# Patient Record
Sex: MALE | Race: ASIAN | NOT HISPANIC OR LATINO | URBAN - METROPOLITAN AREA
[De-identification: names, ages, dates, MRNs, and addresses within clinical notes are randomized per-mention and may not be internally consistent; named-entity substitution may affect disease eponyms.]

---

## 2023-08-30 PROBLEM — Z86.018: Status: ACTIVE | Noted: 2023-08-30

## 2023-08-30 PROBLEM — R91.1 LUNG NODULE: Status: ACTIVE | Noted: 2023-08-30

## 2023-08-30 PROBLEM — K83.8 DILATED BILE DUCT: Status: ACTIVE | Noted: 2023-08-30

## 2023-08-30 PROBLEM — R03.0 ELEVATED BLOOD PRESSURE READING: Status: ACTIVE | Noted: 2023-08-30

## 2023-08-30 PROBLEM — C34.2: Status: ACTIVE | Noted: 2023-08-30

## 2023-08-30 PROBLEM — R60.0 BILATERAL LEG EDEMA: Status: ACTIVE | Noted: 2023-08-30

## 2023-08-30 PROBLEM — I87.2 VENOUS INSUFFICIENCY OF LEFT LOWER EXTREMITY: Status: ACTIVE | Noted: 2023-08-30

## 2023-08-30 PROBLEM — C34.91 NON-SMALL CELL CANCER OF RIGHT LUNG (MULTI): Status: ACTIVE | Noted: 2023-08-30

## 2023-08-30 PROBLEM — D12.6 ADENOMATOUS COLON POLYP: Status: ACTIVE | Noted: 2023-08-30

## 2023-08-30 RX ORDER — FIBER
1 TABLET ORAL DAILY
COMMUNITY

## 2023-08-30 RX ORDER — AMLODIPINE BESYLATE 10 MG/1
1 TABLET ORAL DAILY
COMMUNITY
Start: 2022-11-30

## 2023-10-06 ENCOUNTER — APPOINTMENT (OUTPATIENT)
Dept: LAB | Facility: HOSPITAL | Age: 73
End: 2023-10-06
Payer: COMMERCIAL

## 2023-10-06 ENCOUNTER — HOSPITAL ENCOUNTER (OUTPATIENT)
Dept: RADIOLOGY | Facility: HOSPITAL | Age: 73
Discharge: HOME | End: 2023-10-06
Payer: COMMERCIAL

## 2023-10-06 ENCOUNTER — LAB (OUTPATIENT)
Dept: LAB | Facility: HOSPITAL | Age: 73
End: 2023-10-06
Payer: COMMERCIAL

## 2023-10-06 DIAGNOSIS — C34.2 PRIMARY ADENOCARCINOMA OF MIDDLE LOBE OF RIGHT LUNG (MULTI): ICD-10-CM

## 2023-10-06 DIAGNOSIS — C34.2 PRIMARY ADENOCARCINOMA OF MIDDLE LOBE OF RIGHT LUNG (MULTI): Primary | ICD-10-CM

## 2023-10-06 DIAGNOSIS — C34.2 MALIGNANT NEOPLASM OF MIDDLE LOBE, BRONCHUS OR LUNG (MULTI): ICD-10-CM

## 2023-10-06 LAB
ALBUMIN SERPL BCP-MCNC: 3.7 G/DL (ref 3.4–5)
ALP SERPL-CCNC: 57 U/L (ref 33–136)
ALT SERPL W P-5'-P-CCNC: 23 U/L (ref 10–52)
ANION GAP SERPL CALC-SCNC: 11 MMOL/L (ref 10–20)
AST SERPL W P-5'-P-CCNC: 34 U/L (ref 9–39)
BILIRUB SERPL-MCNC: 0.5 MG/DL (ref 0–1.2)
BUN SERPL-MCNC: 11 MG/DL (ref 6–23)
CALCIUM SERPL-MCNC: 8.4 MG/DL (ref 8.6–10.3)
CHLORIDE SERPL-SCNC: 103 MMOL/L (ref 98–107)
CO2 SERPL-SCNC: 29 MMOL/L (ref 21–32)
CREAT SERPL-MCNC: 0.86 MG/DL (ref 0.5–1.3)
GFR SERPL CREATININE-BSD FRML MDRD: 90 ML/MIN/1.73M*2
GLUCOSE SERPL-MCNC: 99 MG/DL (ref 74–99)
POTASSIUM SERPL-SCNC: 4.3 MMOL/L (ref 3.5–5.3)
PROT SERPL-MCNC: 6.8 G/DL (ref 6.4–8.2)
SODIUM SERPL-SCNC: 139 MMOL/L (ref 136–145)

## 2023-10-06 PROCEDURE — 2550000001 HC RX 255 CONTRASTS: Mod: SE | Performed by: STUDENT IN AN ORGANIZED HEALTH CARE EDUCATION/TRAINING PROGRAM

## 2023-10-06 PROCEDURE — A9575 INJ GADOTERATE MEGLUMI 0.1ML: HCPCS | Mod: SE | Performed by: STUDENT IN AN ORGANIZED HEALTH CARE EDUCATION/TRAINING PROGRAM

## 2023-10-06 PROCEDURE — 36415 COLL VENOUS BLD VENIPUNCTURE: CPT

## 2023-10-06 PROCEDURE — 70553 MRI BRAIN STEM W/O & W/DYE: CPT | Performed by: RADIOLOGY

## 2023-10-06 PROCEDURE — 80053 COMPREHEN METABOLIC PANEL: CPT

## 2023-10-06 PROCEDURE — 71260 CT THORAX DX C+: CPT | Performed by: RADIOLOGY

## 2023-10-06 PROCEDURE — 74177 CT ABD & PELVIS W/CONTRAST: CPT

## 2023-10-06 PROCEDURE — 70553 MRI BRAIN STEM W/O & W/DYE: CPT

## 2023-10-06 PROCEDURE — 74177 CT ABD & PELVIS W/CONTRAST: CPT | Performed by: RADIOLOGY

## 2023-10-06 RX ORDER — GADOTERATE MEGLUMINE 376.9 MG/ML
10 INJECTION INTRAVENOUS
Status: COMPLETED | OUTPATIENT
Start: 2023-10-06 | End: 2023-10-06

## 2023-10-06 RX ADMIN — IOHEXOL 75 ML: 350 INJECTION, SOLUTION INTRAVENOUS at 11:52

## 2023-10-06 RX ADMIN — GADOTERATE MEGLUMINE 10 ML: 376.9 INJECTION INTRAVENOUS at 09:30

## 2023-10-11 ENCOUNTER — SPECIALTY PHARMACY (OUTPATIENT)
Dept: PHARMACY | Facility: CLINIC | Age: 73
End: 2023-10-11

## 2023-10-11 NOTE — PROGRESS NOTES
[facility] Clinical Note    Sotero Pierre is a 73 y.o. male, who is on the specialty pharmacy service for management of: Oncology Core with status of: (Enrolled)     Sotero was contacted on 10/11/2023 with a Bengali  (ID: [id number]).    Refer to the encounter summary report for documentation details about patient counseling and education.      Medication Adherence  The importance of adherence was discussed with the patient and they were advised to take the medication as prescribed by their provider. Sotero was encouraged to call his physician's office if they have a question regarding a missed dose.     Conclusion  Rate your quality of life on scale of 1-10: 10 - Completely satisfied  Rate your satisfaction with  Specialty Pharmacy on scale of 1-10: 10 - Completely satisfied      Patient advised to contact the pharmacy if there are any changes to her medication list, including prescriptions, OTC medications, herbal products, or supplements. Patient was advised of [facility]’s dispensing process, refill timeline, contact information ([phone]), and patient management follow up. Patient confirmed understanding of education conducted during assessment. All patient questions and concerns were addressed to the best of my ability. Patient was encouraged to contact the specialty pharmacy with any questions or concerns.    Confirmed follow-up outreaches are properly scheduled. Reviewed goals of therapy in the program targets.    Ramy Celaya, PharmD

## 2023-10-17 ENCOUNTER — OFFICE VISIT (OUTPATIENT)
Dept: HEMATOLOGY/ONCOLOGY | Facility: HOSPITAL | Age: 73
End: 2023-10-17
Payer: COMMERCIAL

## 2023-10-17 VITALS
RESPIRATION RATE: 16 BRPM | SYSTOLIC BLOOD PRESSURE: 137 MMHG | DIASTOLIC BLOOD PRESSURE: 81 MMHG | BODY MASS INDEX: 20.63 KG/M2 | HEART RATE: 91 BPM | TEMPERATURE: 96.6 F | OXYGEN SATURATION: 96 % | WEIGHT: 120.2 LBS

## 2023-10-17 DIAGNOSIS — C34.2 PRIMARY ADENOCARCINOMA OF MIDDLE LOBE OF RIGHT LUNG (MULTI): Primary | ICD-10-CM

## 2023-10-17 PROBLEM — C34.91 NON-SMALL CELL CANCER OF RIGHT LUNG (MULTI): Status: RESOLVED | Noted: 2023-08-30 | Resolved: 2023-10-17

## 2023-10-17 PROCEDURE — 99215 OFFICE O/P EST HI 40 MIN: CPT | Mod: GC | Performed by: STUDENT IN AN ORGANIZED HEALTH CARE EDUCATION/TRAINING PROGRAM

## 2023-10-17 ASSESSMENT — ENCOUNTER SYMPTOMS
DEPRESSION: 0
OCCASIONAL FEELINGS OF UNSTEADINESS: 0
LOSS OF SENSATION IN FEET: 0

## 2023-10-17 ASSESSMENT — PAIN SCALES - GENERAL: PAINLEVEL: 0-NO PAIN

## 2023-10-17 NOTE — PROGRESS NOTES
[facility] - Medical Oncology Follow-Up Visit    Patient ID: Sotero Pierre is a 73 y.o. male presenting with metastatic EGFR mutant NSCLC     Current therapy: Osimertinib     Chief Concern: follow up to discuss scans and labs    Oncologic History:   DIAGNOSIS  NSCLC adenocarcinoma     STAGING  [id number], stage IV     CURRENT SITES OF DISEASE  RML, R femur, ribs, L clavicle     MOLECULAR GENOMICS  PD-L1 2%  EGFR p.[id number] (NM_[id number] c.2236_2250del15)   KRAS p.A146P (NM_[id number] c.436G>C), subclonal   PTEN p.I135T (NM_000314 c.404T>C)   PTEN p.G132A (NM_000314 c.395G>C)         PRIOR THERAPY  None     CURRENT THERAPY  osimertinib 9/10/22 - present        CURRENT ONCOLOGICAL PROBLEMS  None     HPI   Mr. Pierre is a 71 yo with no significant PMH who had a low-dose screening chest CT on 7/11/22 concerning for 2.4 cm opacity in the RML with a few associated 4mm nodules. He met Dr. Mims on 8/10/22 and had EBUS on 8/15/22 with RML FNA revealing NSCLC  adenocarcinoma, PD-L1 2%, EGFR exon 19, neg lymph node 7. He had PET/CT scan on 8/22/22 with mild FDG-avidity of the opacity in the RML, no lymph nodes, and suspicious FDG-avidity of the proximal R femur and mild avidity of several ribs and L clavicle.  Brain MRI negative. He started osimertinib on 9/10/22.     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 He has a son who lives in [place].  Tob: quit smoking 8/11/22, 1 pack/3-4 days, 44 years  EtOH: 1-2 beers every day  Illicits: none     CURRENT MEDS  see below     ALLERGIES  see below     FAMILY HISTORY  no one in the family with cancer    Subjective:  Patient seen in clinic today on his own. He declined use of [employer] .   He is doing very well with no major health changes since his last visit. He notes a small area of dry skin on his left forearm that he is using non medicated lotion  for. It does not bother him too much.     He is still working part time at the hair salon working 8hr shifts on his feet. He reports he can walk for an hour without stopping. He denies any cough or SOB. Energy is up and down. He says appetite is poor but stable. He is eating 3 meals a day but small portions. He reports his weight is stable. No abd pain, n/v. He is having daily Bms without evidence of bleeding. No leg swelling, numbness/tingling, mouth sores.     Meds (Current):    Current Outpatient Medications:     amLODIPine (Norvasc) 10 mg tablet, Take 1 tablet (10 mg) by mouth once daily., Disp: , Rfl:     multivitamin-min-iron-FA-vit K (Multi-Day Plus Minerals) 18 mg iron-400 mcg-25 mcg tablet, Take 1 tablet by mouth once daily., Disp: , Rfl:     osimertinib (Tagrisso) 80 mg tablet, Take 1 tablet (80 mg total) by mouth once daily., Disp: , Rfl:     osimertinib (Tagrisso) 80 mg tablet, TAKE ONE (1) TABLET BY MOUTH ONCE A DAY, Disp: 80 tablet, Rfl: 2    Review of Systems   All other systems reviewed and are negative.       Objective   BSA: 1.57 meters squared  /81 (BP Location: Left arm, Patient Position: Sitting)   Pulse 91   Temp 35.9 °C (96.6 °F) (Core)   Resp 16   Wt 54.5 kg (120 lb 3.2 oz)   SpO2 96%   BMI 20.63 kg/m²     ECOG Performance Status: 0  Asymptomatic     Physical Exam:  General: elderly male, well appearing, alert, conversant, in no apparent distress  HEENT: normocephalic, atraumatic, EOMI, no scleral icterus  CV: RRR, no murmurs  Pulm: CTAB, no wheezes or crackles, no increased work of breathing  Abd: soft, non tender, non distended  Ext: warm, no lower extremity edema  Skin: several patches of scale present on the left forearm  Neuro: moving all extremities  Psych: normal affect      Results:  Labs:  Lab Results   Component Value Date    WBC 4.4 07/07/2023    HGB 11.8 (L) 07/07/2023    HCT 34.4 (L) 07/07/2023    MCV 80 07/07/2023     07/07/2023      Lab Results   Component  "Value Date    NEUTROABS 2.38 07/07/2023      Lab Results   Component Value Date    GLUCOSE 99 10/06/2023    CALCIUM 8.4 (L) 10/06/2023     10/06/2023    K 4.3 10/06/2023    CO2 29 10/06/2023     10/06/2023    BUN 11 10/06/2023    CREATININE 0.86 10/06/2023     Lab Results   Component Value Date    ALT 23 10/06/2023    AST 34 10/06/2023    ALKPHOS 57 10/06/2023    BILITOT 0.5 10/06/2023    BILIDIR 0.1 01/06/2023      No results found for: \"ACTH\", \"CORTISOL\", \"TSH\", \"FREET4\"    Imaging:  I have personally reviewed the below imaging and concur with the reported findings unless otherwise stated:    === Results for orders placed during the hospital encounter of 10/06/23 ===    MR brain w and wo IV contrast [[id number]] 10/06/2023    Status: Normal  1.  No abnormal enhancement to suggest intracranial metastatic  disease.  2.  Similar chronic intracranial findings as above.      I personally reviewed the images/study with Agus Lama MD (Radiology  Resident) and I agree with the findings as stated. This study was  interpreted at [place], [place].    MACRO:  None    Signed by: Robb Persaud 10/6/2023 11:00 AM  Dictation workstation:   [id number]    CT C/A/P 10/6/23  IMPRESSION:  Non-small-cell lung cancer restaging scan. Findings compared with  most recent CT C AP from 07/07/2023:      CHEST:  1. Stable appearance of low volume intrathoracic tumor burden without  significant change. There is no new site of disease in the chest on  this current study.      ABDOMEN-PELVIS:  1.   There is area of heterogeneity in liver segment VI measuring 1.8  x 1.1 cm which was previously seen on several distant prior studies  and appears more discretely on today's study. Recommend PET-CT or MRI  to rule out hepatic metastatic disease or short-term follow-up CT.  2. Stable appearance of right proximal femur marrow soft tissue when  compared to prior study 07/07/2023.    "       Assessment/Plan      Sotero Pierre is a 73 y.o. male here for follow up of metastatic NSCLC on osimertinib here for review of his scans and labs.    # NSCLC adenocarcinoma  - [id number]  - has osseous metastases, asymptomatic  - PD-L1 2%, EGFR exon 19 deletion  - started osimertinib 9/10/22, tolerating well  - did have diffuse rash which resolved on prednisone. will not dose reduce unless rash recurs. unclear if this is drug rash - no pictures - has not recurred since  - had mild BLE edema which resolved   - personally reviewed scans of CT C/A/P and brain MRI today with continued response. Note liver lesion seen more defined on scans   - repeat CT C/A/P in 3 months and RTC then with labs  - will need yearly brain MRI, due 9/2024 (not yet ordered)    #Liver hemangioma   -seen on MRI from 11/2022, corresponds with lesion visualized on CT scan     # BLE edema - resolved  - nonpitting, mild, resolves with elevation  - noted on 10/2022 mild LLE venous insufficiency  - echocardiogram normal     # Rash - resolved  - had diffuse rash which resolved on 5 days prednisone and has not recurred. unclear if this is a drug rash or not. discussed that if this recurs, to please be evaluated by our team/ACC  - he is concerned with navigating the phone system given language barrier, and has good rapport with his PCP  - encouraged to take pictures if rash recurs and try to see us still  - will not dose reduce unless rash recurs     # Dilated common bile duct, intrahepatic biliary ducts  - seen on restaging scan, asymptomatic - RUQ US as well  - had MRCP and saw GI - will f/u with them     # Alcohol use  - recommended decreasing/stopping daily alcohol use while on osimertinib     # Advanced care planning  - discussed incurable but treatable nature of metastatic lung cancer. Goal of therapy is to prolong life and maintain quality of life. He is in agreement    RTC in 3 months for repeat scans and labs    Patient seen and discussed with  Dr. Mao.     Germania Sheppard MD  IM PGY-3    I saw and evaluated the patient with the resident. I agree with their  assessment and plan unless otherwise noted.

## 2023-10-23 ENCOUNTER — SPECIALTY PHARMACY (OUTPATIENT)
Dept: PHARMACY | Facility: CLINIC | Age: 73
End: 2023-10-23

## 2023-10-23 ENCOUNTER — PHARMACY VISIT (OUTPATIENT)
Dept: PHARMACY | Facility: CLINIC | Age: 73
End: 2023-10-23
Payer: COMMERCIAL

## 2023-10-23 PROCEDURE — RXMED WILLOW AMBULATORY MEDICATION CHARGE

## 2023-11-20 ENCOUNTER — PHARMACY VISIT (OUTPATIENT)
Dept: PHARMACY | Facility: CLINIC | Age: 73
End: 2023-11-20
Payer: COMMERCIAL

## 2023-11-20 ENCOUNTER — SPECIALTY PHARMACY (OUTPATIENT)
Dept: PHARMACY | Facility: CLINIC | Age: 73
End: 2023-11-20

## 2023-11-20 PROCEDURE — RXMED WILLOW AMBULATORY MEDICATION CHARGE

## 2023-12-20 ENCOUNTER — TELEPHONE (OUTPATIENT)
Dept: ADMISSION | Facility: HOSPITAL | Age: 73
End: 2023-12-20
Payer: COMMERCIAL

## 2023-12-20 ENCOUNTER — SPECIALTY PHARMACY (OUTPATIENT)
Dept: PHARMACY | Facility: CLINIC | Age: 73
End: 2023-12-20

## 2023-12-20 ENCOUNTER — NURSE TRIAGE (OUTPATIENT)
Dept: ADMISSION | Facility: HOSPITAL | Age: 73
End: 2023-12-20
Payer: COMMERCIAL

## 2023-12-20 DIAGNOSIS — C34.2 PRIMARY ADENOCARCINOMA OF MIDDLE LOBE OF RIGHT LUNG (MULTI): Primary | ICD-10-CM

## 2023-12-20 DIAGNOSIS — C34.2 CANCER OF MIDDLE LOBE OF LUNG (MULTI): Primary | ICD-10-CM

## 2023-12-20 PROCEDURE — RXMED WILLOW AMBULATORY MEDICATION CHARGE

## 2023-12-20 NOTE — TELEPHONE ENCOUNTER
Pt scheduled for ACC appt tomorrow.   #[phone] left voicemail for pt regarding appointment tomorrow, 12/21/23, at 10:00.

## 2023-12-20 NOTE — TELEPHONE ENCOUNTER
Sotero Pierre called the refill line for his Tagrisso. Would like refills to be sent to [facility]. Message sent to team to send in.

## 2023-12-20 NOTE — TELEPHONE ENCOUNTER
Call returned using  services.  Pt is reporting rash on bilateral antecubital areas of both arms and on his abdomen below his navel.  He describes areas look like bruises.  No itching.  He states that the rash developed this month when the weather got cold.  He indicated he had a rash like this in October 2022.  He saw his family doctor today who gave him a cream and told him he needed to see his oncologist.

## 2023-12-20 NOTE — TELEPHONE ENCOUNTER
Juhi called to request an appt on pt's behalf for a rash that is not responding to topical cream.  She was unable to provide more details and states that pt will need an interpretor to obtain additional information.  Additional Information  • Have you ever had a rash like this before?     Last year  • Commented on: Do you have any swelling in your face, tongue, throat, or elsewhere?     denies  • Commented on: If the rash itches, is it getting worse? Does anything help relieve the itching?     N/A    Protocols used: Rash

## 2023-12-21 ENCOUNTER — TELEPHONE (OUTPATIENT)
Dept: ADMISSION | Facility: HOSPITAL | Age: 73
End: 2023-12-21
Payer: COMMERCIAL

## 2023-12-21 ENCOUNTER — OFFICE VISIT (OUTPATIENT)
Dept: HEMATOLOGY/ONCOLOGY | Facility: HOSPITAL | Age: 73
End: 2023-12-21
Payer: COMMERCIAL

## 2023-12-21 VITALS
TEMPERATURE: 98.4 F | DIASTOLIC BLOOD PRESSURE: 70 MMHG | SYSTOLIC BLOOD PRESSURE: 124 MMHG | WEIGHT: 118.61 LBS | HEART RATE: 104 BPM | BODY MASS INDEX: 20.36 KG/M2 | RESPIRATION RATE: 18 BRPM | OXYGEN SATURATION: 100 %

## 2023-12-21 DIAGNOSIS — C34.2 PRIMARY ADENOCARCINOMA OF MIDDLE LOBE OF RIGHT LUNG (MULTI): Primary | ICD-10-CM

## 2023-12-21 DIAGNOSIS — R21 RASH: ICD-10-CM

## 2023-12-21 DIAGNOSIS — C34.2 CANCER OF MIDDLE LOBE OF LUNG (MULTI): ICD-10-CM

## 2023-12-21 LAB
ALBUMIN SERPL BCP-MCNC: 3.2 G/DL (ref 3.4–5)
ALP SERPL-CCNC: 69 U/L (ref 33–136)
ALT SERPL W P-5'-P-CCNC: 10 U/L (ref 10–52)
ANION GAP SERPL CALC-SCNC: 13 MMOL/L (ref 10–20)
AST SERPL W P-5'-P-CCNC: 17 U/L (ref 9–39)
BASOPHILS # BLD AUTO: 0.02 X10*3/UL (ref 0–0.1)
BASOPHILS NFR BLD AUTO: 0.3 %
BILIRUB SERPL-MCNC: 0.5 MG/DL (ref 0–1.2)
BUN SERPL-MCNC: 12 MG/DL (ref 6–23)
CALCIUM SERPL-MCNC: 8.5 MG/DL (ref 8.6–10.3)
CHLORIDE SERPL-SCNC: 101 MMOL/L (ref 98–107)
CO2 SERPL-SCNC: 23 MMOL/L (ref 21–32)
CREAT SERPL-MCNC: 0.81 MG/DL (ref 0.5–1.3)
EOSINOPHIL # BLD AUTO: 0.02 X10*3/UL (ref 0–0.4)
EOSINOPHIL NFR BLD AUTO: 0.3 %
ERYTHROCYTE [DISTWIDTH] IN BLOOD BY AUTOMATED COUNT: 13.5 % (ref 11.5–14.5)
GFR SERPL CREATININE-BSD FRML MDRD: >90 ML/MIN/1.73M*2
GLUCOSE SERPL-MCNC: 107 MG/DL (ref 74–99)
HCT VFR BLD AUTO: 36.8 % (ref 41–52)
HGB BLD-MCNC: 13 G/DL (ref 13.5–17.5)
IMM GRANULOCYTES # BLD AUTO: 0.1 X10*3/UL (ref 0–0.5)
IMM GRANULOCYTES NFR BLD AUTO: 1.3 % (ref 0–0.9)
LYMPHOCYTES # BLD AUTO: 0.49 X10*3/UL (ref 0.8–3)
LYMPHOCYTES NFR BLD AUTO: 6.2 %
MAGNESIUM SERPL-MCNC: 2.11 MG/DL (ref 1.6–2.4)
MCH RBC QN AUTO: 30.4 PG (ref 26–34)
MCHC RBC AUTO-ENTMCNC: 35.3 G/DL (ref 32–36)
MCV RBC AUTO: 86 FL (ref 80–100)
MONOCYTES # BLD AUTO: 1.12 X10*3/UL (ref 0.05–0.8)
MONOCYTES NFR BLD AUTO: 14.2 %
NEUTROPHILS # BLD AUTO: 6.12 X10*3/UL (ref 1.6–5.5)
NEUTROPHILS NFR BLD AUTO: 77.7 %
NRBC BLD-RTO: 0 /100 WBCS (ref 0–0)
PLATELET # BLD AUTO: 256 X10*3/UL (ref 150–450)
POTASSIUM SERPL-SCNC: 4.1 MMOL/L (ref 3.5–5.3)
PROT SERPL-MCNC: 6.9 G/DL (ref 6.4–8.2)
RBC # BLD AUTO: 4.27 X10*6/UL (ref 4.5–5.9)
SODIUM SERPL-SCNC: 133 MMOL/L (ref 136–145)
WBC # BLD AUTO: 7.9 X10*3/UL (ref 4.4–11.3)

## 2023-12-21 PROCEDURE — 36415 COLL VENOUS BLD VENIPUNCTURE: CPT

## 2023-12-21 PROCEDURE — 99215 OFFICE O/P EST HI 40 MIN: CPT | Mod: 25,ZK | Performed by: NURSE PRACTITIONER

## 2023-12-21 PROCEDURE — 99203 OFFICE O/P NEW LOW 30 MIN: CPT | Performed by: NURSE PRACTITIONER

## 2023-12-21 PROCEDURE — 80053 COMPREHEN METABOLIC PANEL: CPT

## 2023-12-21 PROCEDURE — 83735 ASSAY OF MAGNESIUM: CPT

## 2023-12-21 PROCEDURE — 85025 COMPLETE CBC W/AUTO DIFF WBC: CPT

## 2023-12-21 ASSESSMENT — PAIN SCALES - GENERAL: PAINLEVEL: 0-NO PAIN

## 2023-12-21 NOTE — PROGRESS NOTES
Patient ID: Sotero Pirere is a 73 y.o. male  Providers:  [facility]:  [facility]        ONCOLOGIC HISTORY  DIAGNOSIS  NSCLC adenocarcinoma     STAGING  [id number], stage IV     CURRENT SITES OF DISEASE  RML, R femur, ribs, L clavicle     MOLECULAR GENOMICS  PD-L1 2%  EGFR p.[id number] ([id number] c.2236_2250del15)   KRAS p.A146P (NM_033360 c.436G>C), subclonal   PTEN p.I135T (NM_000314 c.404T>C)   PTEN p.G132A (NM_000314 c.395G>C)         PRIOR THERAPY  None     CURRENT THERAPY  osimertinib 9/10/22 - present        CURRENT ONCOLOGICAL PROBLEMS  None       Past Medical History:   No past medical history on file.   Surgical History:    Past Surgical History:   Procedure Laterality Date    OTHER SURGICAL HISTORY  02/07/2022    Tumor excision      Family History:    No family history on file.  Family Oncology History:    Cancer-related family history is not on file.  Social History:    Social History     Tobacco Use    Smoking status: Never    Smokeless tobacco: Never          Subjective   Chief Complaint: rash     HPI   Sotero is a 73 y.o. male with metastaic NSCLC on osimertinib, who presents to [facility] with rash. NAIN used to help with interpreting services. Patient reports he had a rash similar to this last year. Rash is on his arms and abdomen. Patient describes rash as looking like bruises. He reports the rash developed earlier this month when the weather got cold. No pain or itching associated with rash. Patient saw primary care yesterday who recommended he see his oncologist.       ROS  Review of Systems - Oncology    Allergies  No Known Allergies     Medications  Current Outpatient Medications   Medication Instructions    amLODIPine (Norvasc) 10 mg tablet 1 tablet, oral, Daily    multivitamin-min-iron-FA-vit K (Multi-Day Plus Minerals) 18 mg iron-400 mcg-25 mcg tablet 1 tablet, oral, Daily    osimertinib (Tagrisso) 80 mg tablet 1 tablet, oral, Daily    osimertinib (TAGRISSO) 80 mg, oral, Daily, Swallow whole.          Objective    Vitals: /70 (BP Location: Left arm, Patient Position: Sitting)   Pulse 104   Temp 36.9 °C (98.4 °F) (Core)   Resp 18   Wt 53.8 kg (118 lb 9.7 oz)   SpO2 100%   BMI 20.36 kg/m²   Weight:   Vitals:    12/21/23 1017   Weight: 53.8 kg (118 lb 9.7 oz)         Physical Exam  Vitals reviewed.   Constitutional:       Appearance: Normal appearance. He is normal weight.   HENT:      Head: Normocephalic and atraumatic.      Mouth/Throat:      Mouth: Mucous membranes are moist.   Eyes:      Conjunctiva/sclera: Conjunctivae normal.   Cardiovascular:      Rate and Rhythm: Normal rate.   Pulmonary:      Effort: Pulmonary effort is normal.   Abdominal:      General: Abdomen is flat.      Palpations: Abdomen is soft.   Skin:     General: Skin is warm and dry.      Comments: Scattered rash on bilat upper extremities and abdomen that resembles ecchymosis. Rash is not raised.    Neurological:      Mental Status: He is alert.           Diagnostic Results     Labs  Results from last 7 days   Lab Units 12/21/23  1059   WBC AUTO x10*3/uL 7.9   HEMOGLOBIN g/dL 13.0*   HEMATOCRIT % 36.8*   PLATELETS AUTO x10*3/uL 256   NEUTROS ABS x10*3/uL 6.12*   LYMPHS ABS AUTO x10*3/uL 0.49*   MONOS ABS AUTO x10*3/uL 1.12*   EOS ABS AUTO x10*3/uL 0.02   NEUTROS PCT AUTO % 77.7   LYMPHS PCT AUTO % 6.2   MONOS PCT AUTO % 14.2   EOS PCT AUTO % 0.3      Results from last 7 days   Lab Units 12/21/23  1059   GLUCOSE mg/dL 107*   SODIUM mmol/L 133*   POTASSIUM mmol/L 4.1   CHLORIDE mmol/L 101   CO2 mmol/L 23   BUN mg/dL 12   CREATININE mg/dL 0.81   EGFR mL/min/1.73m*2 >90   CALCIUM mg/dL 8.5*   MAGNESIUM mg/dL 2.11   ALBUMIN g/dL 3.2*   PROTEIN TOTAL g/dL 6.9   BILIRUBIN TOTAL mg/dL 0.5   ALK PHOS U/L 69   ALT U/L 10   AST U/L 17                            Assessment/Plan   ASSESSMENT  Sotero Pierre is a 73 y.o. male with Primary adenocarcinoma of middle lobe of right lung (CMS/HCC), Clinical: Stage IVB (cT1c, cNX, cM1c) on osimertinib presents for  evaluation of rash.       ACC Course  - labs s/f mild hyponatremia and hypoalbuminemia  - Referral to Derm oncology placed and appointment scheduled for tomorrow         Dispo:  - Patient discharged home with no needs after ACC course complete  - Return to clinic/ED instructions given to patient  - Follow up w/ Derm Oncology as scheduled tomorrow 12/22 at 1pm         BUBBA Sin-CNP

## 2023-12-21 NOTE — TELEPHONE ENCOUNTER
Patient following up on yesterdays call, advised he was scheduled for ACC today at 10am, voicemail left by interpretor  Patient verbalized understanding, headed to appt now

## 2023-12-22 ENCOUNTER — PHARMACY VISIT (OUTPATIENT)
Dept: PHARMACY | Facility: CLINIC | Age: 73
End: 2023-12-22
Payer: COMMERCIAL

## 2023-12-22 ENCOUNTER — OFFICE VISIT (OUTPATIENT)
Dept: DERMATOLOGY | Facility: CLINIC | Age: 73
End: 2023-12-22
Payer: COMMERCIAL

## 2023-12-22 DIAGNOSIS — R21 RASH AND OTHER NONSPECIFIC SKIN ERUPTION: Primary | ICD-10-CM

## 2023-12-22 PROCEDURE — 1036F TOBACCO NON-USER: CPT | Performed by: DERMATOLOGY

## 2023-12-22 PROCEDURE — 1159F MED LIST DOCD IN RCRD: CPT | Performed by: DERMATOLOGY

## 2023-12-22 PROCEDURE — 99204 OFFICE O/P NEW MOD 45 MIN: CPT | Performed by: DERMATOLOGY

## 2023-12-22 PROCEDURE — 1126F AMNT PAIN NOTED NONE PRSNT: CPT | Performed by: DERMATOLOGY

## 2023-12-22 PROCEDURE — RXMED WILLOW AMBULATORY MEDICATION CHARGE

## 2023-12-22 RX ORDER — TRIAMCINOLONE ACETONIDE 1 MG/G
CREAM TOPICAL
Qty: 454 G | Refills: 2 | Status: SHIPPED | OUTPATIENT
Start: 2023-12-22 | End: 2024-04-16 | Stop reason: ALTCHOICE

## 2023-12-22 NOTE — Clinical Note
Dr. Mao,   We saw your patient Mr. Pierre today for a new onset rash. We suspect it is a cutaneous reaction to the Tagrisso, which appears non life threatening. We recommend triamcinolone 1% cream BID (ordered) and continuing on Tagrisso. We will see him again in 3 months.  Thank you, Dipti Lu MD, PhD Resident, Dermatology

## 2023-12-22 NOTE — PROGRESS NOTES
Natanael Pierre is a 73 y.o. male who presents for the following: Rash (DermaPhor ointment ). Referred to dermatology for rash. Swedish is primary language, declines Roxanne  today.     Noticed skin changes starting about a month ago at the beginning of December 2023, around the time the weather started getting cold. He says he notices color changes on his arms, legs, and trunk. Denies itch. Denies bumps/blisters. Denies oral lesions, ocular pain/dryness, or pain on urination. Denies history of similar rash. Thinks rash is stable or improved (possibly less on upper abdomen) since onset. Puts Lubriderm on his skin daily for 20+ years, and uses dermaphor without improvement. He received ceterizine over a year ago for an itchy rash, that rash improved with a few doses, and he says that rash was different than what he has today.    He has a history of NSCLC on osimertinib (tagrisso) 80 mg PO daily; also takes multivitamin daily and amlodipine daily.    Review of Systems:  No other skin or systemic complaints other than what is documented elsewhere in the note.    The following portions of the chart were reviewed this encounter and updated as appropriate:          Skin Cancer History  No skin cancer on file.      Specialty Problems    None       Objective   Well appearing patient in no apparent distress; mood and affect are within normal limits.    A focused skin examination was performed. All findings within normal limits unless otherwise noted below.    Assessment/Plan   1. Rash and other nonspecific skin eruption  Arms and Legs, Trunk  Pink to purple macules and patches with rare papules    - rash, NOS, suspect cutaneous reaction to EGFR inhibitor  - does not appear to be life threatening, and has no associated symptoms, no mucous membrane involvement  - recommend triamcinolone 0.1% cream to body daily  - recommend continuation of osimertinib  - will 'cc his oncologist Polina Mao on note  - FUV  3  months    Related Procedures  Follow Up In Dermatology - Established Patient    Related Medications  triamcinolone (Kenalog) 0.1 % cream  Apply twice daily to affected areas of body (avoid face, groin, body folds) for 2 months, then taper to twice daily on weekends only; repeat every 6-8 weeks as needed for flares      Seen and discussed with attending physician Dr. Guillermo Lu MD, PhD  Resident, Dermatology    I saw and evaluated the patient. I personally obtained the key and critical portions of the history and physical exam or was physically present for key and critical portions performed by the resident/fellow. I reviewed the resident/fellow's documentation and discussed the patient with the resident/fellow. I agree with the resident/fellow's medical decision making as documented in the note.    Blaine Li MD PhD

## 2023-12-26 ENCOUNTER — PHARMACY VISIT (OUTPATIENT)
Dept: PHARMACY | Facility: CLINIC | Age: 73
End: 2023-12-26
Payer: COMMERCIAL

## 2024-01-10 ENCOUNTER — LAB (OUTPATIENT)
Dept: LAB | Facility: HOSPITAL | Age: 74
End: 2024-01-10
Payer: COMMERCIAL

## 2024-01-10 ENCOUNTER — HOSPITAL ENCOUNTER (OUTPATIENT)
Dept: RADIOLOGY | Facility: HOSPITAL | Age: 74
Discharge: HOME | End: 2024-01-10
Payer: COMMERCIAL

## 2024-01-10 DIAGNOSIS — C34.2 PRIMARY ADENOCARCINOMA OF MIDDLE LOBE OF RIGHT LUNG (MULTI): ICD-10-CM

## 2024-01-10 LAB
BASOPHILS # BLD AUTO: 0.03 X10*3/UL (ref 0–0.1)
BASOPHILS NFR BLD AUTO: 0.4 %
EOSINOPHIL # BLD AUTO: 0.01 X10*3/UL (ref 0–0.4)
EOSINOPHIL NFR BLD AUTO: 0.1 %
ERYTHROCYTE [DISTWIDTH] IN BLOOD BY AUTOMATED COUNT: 13.8 % (ref 11.5–14.5)
HCT VFR BLD AUTO: 37.7 % (ref 41–52)
HGB BLD-MCNC: 12.5 G/DL (ref 13.5–17.5)
HOLD SPECIMEN: NORMAL
IMM GRANULOCYTES # BLD AUTO: 0.07 X10*3/UL (ref 0–0.5)
IMM GRANULOCYTES NFR BLD AUTO: 1 % (ref 0–0.9)
LYMPHOCYTES # BLD AUTO: 0.55 X10*3/UL (ref 0.8–3)
LYMPHOCYTES NFR BLD AUTO: 8.1 %
MCH RBC QN AUTO: 29.3 PG (ref 26–34)
MCHC RBC AUTO-ENTMCNC: 33.2 G/DL (ref 32–36)
MCV RBC AUTO: 89 FL (ref 80–100)
MONOCYTES # BLD AUTO: 0.78 X10*3/UL (ref 0.05–0.8)
MONOCYTES NFR BLD AUTO: 11.4 %
NEUTROPHILS # BLD AUTO: 5.39 X10*3/UL (ref 1.6–5.5)
NEUTROPHILS NFR BLD AUTO: 79 %
NRBC BLD-RTO: 0 /100 WBCS (ref 0–0)
PLATELET # BLD AUTO: 226 X10*3/UL (ref 150–450)
RBC # BLD AUTO: 4.26 X10*6/UL (ref 4.5–5.9)
WBC # BLD AUTO: 6.8 X10*3/UL (ref 4.4–11.3)

## 2024-01-10 PROCEDURE — 36415 COLL VENOUS BLD VENIPUNCTURE: CPT

## 2024-01-10 PROCEDURE — 71260 CT THORAX DX C+: CPT | Performed by: RADIOLOGY

## 2024-01-10 PROCEDURE — 85025 COMPLETE CBC W/AUTO DIFF WBC: CPT

## 2024-01-10 PROCEDURE — 2550000001 HC RX 255 CONTRASTS: Performed by: STUDENT IN AN ORGANIZED HEALTH CARE EDUCATION/TRAINING PROGRAM

## 2024-01-10 PROCEDURE — 74177 CT ABD & PELVIS W/CONTRAST: CPT | Performed by: RADIOLOGY

## 2024-01-10 PROCEDURE — 71260 CT THORAX DX C+: CPT

## 2024-01-10 RX ADMIN — IOHEXOL 84 ML: 350 INJECTION, SOLUTION INTRAVENOUS at 10:16

## 2024-01-19 ENCOUNTER — PHARMACY VISIT (OUTPATIENT)
Dept: PHARMACY | Facility: CLINIC | Age: 74
End: 2024-01-19
Payer: COMMERCIAL

## 2024-01-19 PROCEDURE — RXMED WILLOW AMBULATORY MEDICATION CHARGE

## 2024-01-23 ENCOUNTER — OFFICE VISIT (OUTPATIENT)
Dept: HEMATOLOGY/ONCOLOGY | Facility: HOSPITAL | Age: 74
End: 2024-01-23
Payer: COMMERCIAL

## 2024-01-23 ENCOUNTER — PHARMACY VISIT (OUTPATIENT)
Dept: PHARMACY | Facility: CLINIC | Age: 74
End: 2024-01-23
Payer: COMMERCIAL

## 2024-01-23 VITALS
HEART RATE: 97 BPM | RESPIRATION RATE: 18 BRPM | BODY MASS INDEX: 19.15 KG/M2 | TEMPERATURE: 97.7 F | SYSTOLIC BLOOD PRESSURE: 122 MMHG | WEIGHT: 111.55 LBS | OXYGEN SATURATION: 97 % | DIASTOLIC BLOOD PRESSURE: 62 MMHG

## 2024-01-23 DIAGNOSIS — C34.2 PRIMARY ADENOCARCINOMA OF MIDDLE LOBE OF RIGHT LUNG (MULTI): ICD-10-CM

## 2024-01-23 PROCEDURE — 1126F AMNT PAIN NOTED NONE PRSNT: CPT | Performed by: STUDENT IN AN ORGANIZED HEALTH CARE EDUCATION/TRAINING PROGRAM

## 2024-01-23 PROCEDURE — RXMED WILLOW AMBULATORY MEDICATION CHARGE

## 2024-01-23 PROCEDURE — 1159F MED LIST DOCD IN RCRD: CPT | Performed by: STUDENT IN AN ORGANIZED HEALTH CARE EDUCATION/TRAINING PROGRAM

## 2024-01-23 PROCEDURE — 99215 OFFICE O/P EST HI 40 MIN: CPT | Performed by: STUDENT IN AN ORGANIZED HEALTH CARE EDUCATION/TRAINING PROGRAM

## 2024-01-23 PROCEDURE — 1036F TOBACCO NON-USER: CPT | Performed by: STUDENT IN AN ORGANIZED HEALTH CARE EDUCATION/TRAINING PROGRAM

## 2024-01-23 RX ORDER — PANTOPRAZOLE SODIUM 40 MG/1
40 TABLET, DELAYED RELEASE ORAL DAILY
Qty: 30 TABLET | Refills: 5 | Status: SHIPPED | OUTPATIENT
Start: 2024-01-23 | End: 2024-01-23 | Stop reason: SDUPTHER

## 2024-01-23 RX ORDER — PREDNISONE 10 MG/1
TABLET ORAL
Qty: 98 TABLET | Refills: 0 | Status: SHIPPED | OUTPATIENT
Start: 2024-01-23 | End: 2024-01-23 | Stop reason: SDUPTHER

## 2024-01-23 RX ORDER — PREDNISONE 10 MG/1
TABLET ORAL
Qty: 98 TABLET | Refills: 0 | Status: SHIPPED | OUTPATIENT
Start: 2024-01-23 | End: 2024-02-20

## 2024-01-23 RX ORDER — PANTOPRAZOLE SODIUM 40 MG/1
40 TABLET, DELAYED RELEASE ORAL DAILY
Qty: 30 TABLET | Refills: 5 | Status: SHIPPED | OUTPATIENT
Start: 2024-01-23 | End: 2024-04-16 | Stop reason: ALTCHOICE

## 2024-01-23 ASSESSMENT — ENCOUNTER SYMPTOMS
OCCASIONAL FEELINGS OF UNSTEADINESS: 0
LOSS OF SENSATION IN FEET: 0
DEPRESSION: 0

## 2024-01-23 ASSESSMENT — COLUMBIA-SUICIDE SEVERITY RATING SCALE - C-SSRS
6. HAVE YOU EVER DONE ANYTHING, STARTED TO DO ANYTHING, OR PREPARED TO DO ANYTHING TO END YOUR LIFE?: NO
2. HAVE YOU ACTUALLY HAD ANY THOUGHTS OF KILLING YOURSELF?: NO
1. IN THE PAST MONTH, HAVE YOU WISHED YOU WERE DEAD OR WISHED YOU COULD GO TO SLEEP AND NOT WAKE UP?: NO

## 2024-01-23 ASSESSMENT — PAIN SCALES - GENERAL: PAINLEVEL: 0-NO PAIN

## 2024-01-23 ASSESSMENT — PATIENT HEALTH QUESTIONNAIRE - PHQ9
SUM OF ALL RESPONSES TO PHQ9 QUESTIONS 1 AND 2: 0
1. LITTLE INTEREST OR PLEASURE IN DOING THINGS: NOT AT ALL
2. FEELING DOWN, DEPRESSED OR HOPELESS: NOT AT ALL

## 2024-01-23 NOTE — PROGRESS NOTES
[facility] - Medical Oncology Follow-Up Visit    Patient ID: Sotero Pierre is a 73 y.o. male presenting with metastatic EGFR mutant NSCLC     Current therapy: Osimertinib     Chief Concern: follow up to discuss scans and labs    Oncologic History:   DIAGNOSIS  NSCLC adenocarcinoma     STAGING  [id number], stage IV     CURRENT SITES OF DISEASE  RML, R femur, ribs, L clavicle     MOLECULAR GENOMICS  PD-L1 2%  EGFR p.[id number] (NM_005228 c.2236_2250del15)   KRAS p.A146P (NM_033360 c.436G>C), subclonal   PTEN p.I135T (NM_000314 c.404T>C)   PTEN p.G132A (NM_000314 c.395G>C)         PRIOR THERAPY  None     CURRENT THERAPY  osimertinib 9/10/22 - present        CURRENT ONCOLOGICAL PROBLEMS  None     HPI   Mr. Pierre is a 73 yo with no significant PMH who had a low-dose screening chest CT on 7/11/22 concerning for 2.4 cm opacity in the RML with a few associated 4mm nodules. He met Dr. Mims on 8/10/22 and had EBUS on 8/15/22 with RML FNA revealing NSCLC  adenocarcinoma, PD-L1 2%, EGFR exon 19, neg lymph node 7. He had PET/CT scan on 8/22/22 with mild FDG-avidity of the opacity in the RML, no lymph nodes, and suspicious FDG-avidity of the proximal R femur and mild avidity of several ribs and L clavicle.  Brain MRI negative. He started osimertinib on 9/10/22. Initially with mild rash controlled with cetirizine. 12/2023 with different purple macular rash thought to also be cutaneous reaction to osimertinib; then with concern for asymptomatic G1 pneumonitis on restaging CT scan. Osimertinib held and started on 1 mg/kg prednisone.      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 He has a son who lives in [place].  Tob: quit smoking 8/11/22, 1 pack/3-4 days, 44 years  EtOH: 1-2 beers every day  Illicits: none     CURRENT MEDS  see below     ALLERGIES  see below     FAMILY HISTORY  no one in the family  with cancer    Subjective:  He is seen today alone. Declined use of Roxanne initially. Ultimately agreed to use [employer] . When the cold weather came, he started having a rash, his PCP tried different meds and ultimately instructed him to see onc. He was seen in [facility] clinic and referred to dermatology, who felt this was due to osimertinib. The rash is now better but not all gone. He is using triamcinolone. It has never been itchy. He continues to work part time and feels well. No difficulty breathing, no cough, energy level is good and appetite is good.     Meds (Current):    Current Outpatient Medications:     amLODIPine (Norvasc) 10 mg tablet, Take 1 tablet (10 mg) by mouth once daily., Disp: , Rfl:     multivitamin-min-iron-FA-vit K (Multi-Day Plus Minerals) 18 mg iron-400 mcg-25 mcg tablet, Take 1 tablet by mouth once daily., Disp: , Rfl:     osimertinib (Tagrisso) 80 mg tablet, Take 1 tablet (80 mg total) by mouth once daily.  Swallow whole., Disp: 30 tablet, Rfl: 3    triamcinolone (Kenalog) 0.1 % cream, Apply twice daily to affected areas of body (avoid face, groin, body folds) for 2 months, then taper to twice daily on weekends only; repeat every 6-8 weeks as needed for flares, Disp: 454 g, Rfl: 2    Review of Systems   All other systems reviewed and are negative.       Objective   BSA: 1.51 meters squared  /62 (BP Location: Left arm, Patient Position: Sitting, BP Cuff Size: Adult)   Pulse 97   Temp 36.5 °C (97.7 °F) (Temporal)   Resp 18   Wt 50.6 kg (111 lb 8.8 oz)   SpO2 97%   BMI 19.15 kg/m²     ECOG Performance Status: 0  Asymptomatic     Physical Exam:  General: elderly male, well appearing, alert, conversant, in no apparent distress  HEENT: normocephalic, atraumatic, EOMI, no scleral icterus  CV: RRR, no murmurs  Pulm: CTAB, no wheezes or crackles, no increased work of breathing  Abd: soft, non tender, non distended  Ext: warm, no lower extremity edema  Skin: several patches of scale  "present on the left forearm  Neuro: moving all extremities  Psych: normal affect      Results:  Labs:  Lab Results   Component Value Date    WBC 6.8 01/10/2024    HGB 12.5 (L) 01/10/2024    HCT 37.7 (L) 01/10/2024    MCV 89 01/10/2024     01/10/2024      Lab Results   Component Value Date    NEUTROABS 5.39 01/10/2024      Lab Results   Component Value Date    GLUCOSE 107 (H) 12/21/2023    CALCIUM 8.5 (L) 12/21/2023     (L) 12/21/2023    K 4.1 12/21/2023    CO2 23 12/21/2023     12/21/2023    BUN 12 12/21/2023    CREATININE 0.81 12/21/2023    MG 2.11 12/21/2023     Lab Results   Component Value Date    ALT 10 12/21/2023    AST 17 12/21/2023    ALKPHOS 69 12/21/2023    BILITOT 0.5 12/21/2023    BILIDIR 0.1 01/06/2023      No results found for: \"ACTH\", \"CORTISOL\", \"TSH\", \"FREET4\"    Imaging:  I have personally reviewed the below imaging and concur with the reported findings unless otherwise stated:    CT chest abdomen pelvis w IV contrast    Result Date: 1/15/2024  Impression: 1. Lung cancer restaging scan. Compared to CT dated 10/06/2023, there is interval development of extensive bilateral lung opacities as described above, with subpleural and upper lung predominance. Correlate with concern for treatment related pneumonitis. Additional differential consideration includes an infectious process. 2. The development of the extensive new opacities limits evaluation for pulmonary nodules, however there are suspected several new nodules within the right lower lobe which may represent metastasis or may be related to pneumonitis/infection. Close attention on follow-up is recommended. 3. The right middle lobe nodule corresponding to the patient's known cancer is stable. 4. Mild interval enlargement of mediastinal and bilateral hilar lymph nodes, which are indeterminate, whether reactive or metastatic. 5. Stable bone lesions as described above. No new osseous lesions are identified. 6. Additional chronic " findings as described above.   I personally reviewed the images/study and I agree with the findings as stated by resident physician Luis Fernando Leonardo MD. This study was interpreted at [facility], [place], [place].   MACRO: None   Signed by: Ruslan Simon 1/15/2024 11:23 PM Dictation workstation:   [id number]         Assessment/Plan      Sotero Pierre is a 73 y.o. male here for follow up of metastatic NSCLC on osimertinib here for review of his scans and labs.    # NSCLC adenocarcinoma  - [id number]  - has osseous metastases, asymptomatic  - PD-L1 2%, EGFR exon 19 deletion  - started osimertinib 9/10/22, tolerating well  - did have diffuse rash which resolved on prednisone. will not dose reduce unless rash recurs. unclear if this is drug rash - no pictures - has not recurred since  - had mild BLE edema which resolved   - 12/2023 had new pink/purple macular rash, evaluated by dermatology, likely due to osimertinib and is improving  - personally reviewed CT Scan with concern for pneumonitis - asymptomatic, will hold osimertinib and treat with steroids for now, see below  - will need yearly brain MRI, due 9/2024 (not yet ordered)  - RTC 4 weeks    # G1 pneumonitis  - asymptomatic, seen on CT scan  - will hold osimertinib as above and start empiric steroids with 1 mg/kg prednisone, to taper down by 10 mg/week: 50 mg/day for 7 days, then 40 mg/day for 7 days, then 30 mg/day for 7 days, then 20 mg/day for 7 days.   - RTC when on 20 mg/day  - will likely repeat CT scan to evaluate for improvement, and consider rechallenge with osimertinib  - he knows to call if he develops symptoms of pneumonitis    #Liver hemangioma   -seen on MRI from 11/2022, corresponds with lesion visualized on CT scan     # BLE edema - resolved  - nonpitting, mild, resolves with elevation  - noted on 10/2022 mild LLE venous insufficiency  - echocardiogram normal     # Rash - see above  - had diffuse rash within  weeks of starting osimertinib, which resolved on 5 days prednisone and has not recurred. unclear if this is a drug rash or not. discussed that if this recurs, to please be evaluated by our team/ACC  - had different rash in 12/2023, non pruritic, macular and purple in color. Saw dermatology, likely drug rash but recommended continuation of osimertinib     # Dilated common bile duct, intrahepatic biliary ducts  - seen on restaging scan, asymptomatic - RUQ US as well  - had MRCP and saw GI - will f/u with them     # Alcohol use  - recommended decreasing/stopping daily alcohol use while on osimertinib     # Advanced care planning  - discussed incurable but treatable nature of metastatic lung cancer. Goal of therapy is to prolong life and maintain quality of life. He is in agreement

## 2024-01-24 ENCOUNTER — TELEPHONE (OUTPATIENT)
Dept: HEMATOLOGY/ONCOLOGY | Facility: HOSPITAL | Age: 74
End: 2024-01-24
Payer: COMMERCIAL

## 2024-01-24 NOTE — TELEPHONE ENCOUNTER
Juhi from [facility] calls to state that the patient stopped by today with his AVS from yesterday's visit and he is confused on the Tagrisso. He is under the impression that Tagrisso is being stopped and another medication is being prescribed. Juhi asks if maybe the dose is being reduced.  Clinic notes do not indicate that medication is being stopped or reduced.  Message sent to team to clarify.

## 2024-01-24 NOTE — TELEPHONE ENCOUNTER
Juhi called back, reviewed directives from Dr. Mao clinic. She was able to repeat back all directions and will review with pt.   Confirmed next appt as well.

## 2024-01-25 ENCOUNTER — PHARMACY VISIT (OUTPATIENT)
Dept: PHARMACY | Facility: CLINIC | Age: 74
End: 2024-01-25
Payer: COMMERCIAL

## 2024-02-20 ENCOUNTER — SPECIALTY PHARMACY (OUTPATIENT)
Dept: PHARMACY | Facility: CLINIC | Age: 74
End: 2024-02-20

## 2024-02-20 ENCOUNTER — LAB (OUTPATIENT)
Dept: LAB | Facility: HOSPITAL | Age: 74
End: 2024-02-20
Payer: COMMERCIAL

## 2024-02-20 ENCOUNTER — OFFICE VISIT (OUTPATIENT)
Dept: HEMATOLOGY/ONCOLOGY | Facility: HOSPITAL | Age: 74
End: 2024-02-20
Payer: COMMERCIAL

## 2024-02-20 ENCOUNTER — PHARMACY VISIT (OUTPATIENT)
Dept: PHARMACY | Facility: CLINIC | Age: 74
End: 2024-02-20
Payer: COMMERCIAL

## 2024-02-20 VITALS
WEIGHT: 116 LBS | HEART RATE: 105 BPM | RESPIRATION RATE: 18 BRPM | TEMPERATURE: 99.1 F | DIASTOLIC BLOOD PRESSURE: 70 MMHG | SYSTOLIC BLOOD PRESSURE: 133 MMHG | HEIGHT: 63 IN | BODY MASS INDEX: 20.55 KG/M2 | OXYGEN SATURATION: 98 %

## 2024-02-20 DIAGNOSIS — C34.2 PRIMARY ADENOCARCINOMA OF MIDDLE LOBE OF RIGHT LUNG (MULTI): ICD-10-CM

## 2024-02-20 DIAGNOSIS — J98.4 PNEUMONITIS: Primary | ICD-10-CM

## 2024-02-20 LAB
ALBUMIN SERPL BCP-MCNC: 3.1 G/DL (ref 3.4–5)
ALP SERPL-CCNC: 64 U/L (ref 33–136)
ALT SERPL W P-5'-P-CCNC: 16 U/L (ref 10–52)
ANION GAP SERPL CALC-SCNC: 14 MMOL/L (ref 10–20)
AST SERPL W P-5'-P-CCNC: 18 U/L (ref 9–39)
BASOPHILS # BLD AUTO: 0.03 X10*3/UL (ref 0–0.1)
BASOPHILS NFR BLD AUTO: 0.4 %
BILIRUB SERPL-MCNC: 0.5 MG/DL (ref 0–1.2)
BUN SERPL-MCNC: 11 MG/DL (ref 6–23)
CALCIUM SERPL-MCNC: 7.9 MG/DL (ref 8.6–10.3)
CHLORIDE SERPL-SCNC: 100 MMOL/L (ref 98–107)
CO2 SERPL-SCNC: 24 MMOL/L (ref 21–32)
CREAT SERPL-MCNC: 0.73 MG/DL (ref 0.5–1.3)
EGFRCR SERPLBLD CKD-EPI 2021: >90 ML/MIN/1.73M*2
EOSINOPHIL # BLD AUTO: 0.15 X10*3/UL (ref 0–0.4)
EOSINOPHIL NFR BLD AUTO: 1.8 %
ERYTHROCYTE [DISTWIDTH] IN BLOOD BY AUTOMATED COUNT: 16.7 % (ref 11.5–14.5)
GLUCOSE SERPL-MCNC: 99 MG/DL (ref 74–99)
HCT VFR BLD AUTO: 35.1 % (ref 41–52)
HGB BLD-MCNC: 12.5 G/DL (ref 13.5–17.5)
IMM GRANULOCYTES # BLD AUTO: 0.17 X10*3/UL (ref 0–0.5)
IMM GRANULOCYTES NFR BLD AUTO: 2 % (ref 0–0.9)
LYMPHOCYTES # BLD AUTO: 0.73 X10*3/UL (ref 0.8–3)
LYMPHOCYTES NFR BLD AUTO: 8.8 %
MCH RBC QN AUTO: 29.6 PG (ref 26–34)
MCHC RBC AUTO-ENTMCNC: 35.6 G/DL (ref 32–36)
MCV RBC AUTO: 83 FL (ref 80–100)
MONOCYTES # BLD AUTO: 1.14 X10*3/UL (ref 0.05–0.8)
MONOCYTES NFR BLD AUTO: 13.7 %
NEUTROPHILS # BLD AUTO: 6.09 X10*3/UL (ref 1.6–5.5)
NEUTROPHILS NFR BLD AUTO: 73.3 %
NRBC BLD-RTO: 0 /100 WBCS (ref 0–0)
PLATELET # BLD AUTO: 230 X10*3/UL (ref 150–450)
POTASSIUM SERPL-SCNC: 3.9 MMOL/L (ref 3.5–5.3)
PROT SERPL-MCNC: 6 G/DL (ref 6.4–8.2)
RBC # BLD AUTO: 4.22 X10*6/UL (ref 4.5–5.9)
SODIUM SERPL-SCNC: 134 MMOL/L (ref 136–145)
WBC # BLD AUTO: 8.3 X10*3/UL (ref 4.4–11.3)

## 2024-02-20 PROCEDURE — 85025 COMPLETE CBC W/AUTO DIFF WBC: CPT

## 2024-02-20 PROCEDURE — 80053 COMPREHEN METABOLIC PANEL: CPT

## 2024-02-20 PROCEDURE — 99214 OFFICE O/P EST MOD 30 MIN: CPT | Performed by: STUDENT IN AN ORGANIZED HEALTH CARE EDUCATION/TRAINING PROGRAM

## 2024-02-20 PROCEDURE — 36415 COLL VENOUS BLD VENIPUNCTURE: CPT

## 2024-02-20 PROCEDURE — 1036F TOBACCO NON-USER: CPT | Performed by: STUDENT IN AN ORGANIZED HEALTH CARE EDUCATION/TRAINING PROGRAM

## 2024-02-20 PROCEDURE — RXMED WILLOW AMBULATORY MEDICATION CHARGE

## 2024-02-20 PROCEDURE — 1126F AMNT PAIN NOTED NONE PRSNT: CPT | Performed by: STUDENT IN AN ORGANIZED HEALTH CARE EDUCATION/TRAINING PROGRAM

## 2024-02-20 PROCEDURE — 1159F MED LIST DOCD IN RCRD: CPT | Performed by: STUDENT IN AN ORGANIZED HEALTH CARE EDUCATION/TRAINING PROGRAM

## 2024-02-20 ASSESSMENT — PAIN SCALES - GENERAL: PAINLEVEL: 0-NO PAIN

## 2024-02-20 NOTE — PROGRESS NOTES
[facility] - Medical Oncology Follow-Up Visit    Patient ID: Sotero Pierre is a 73 y.o. male presenting with metastatic EGFR mutant NSCLC     Current therapy: Osimertinib     Chief Concern: follow up to discuss scans and labs    Oncologic History:   DIAGNOSIS  NSCLC adenocarcinoma     STAGING  [id number], stage IV     CURRENT SITES OF DISEASE  RML, R femur, ribs, L clavicle     MOLECULAR GENOMICS  PD-L1 2%  EGFR p.[id number] (NM_005228 c.[id number])   KRAS p.A146P (NM_033360 c.436G>C), subclonal   PTEN p.I135T (NM_000314 c.404T>C)   PTEN p.G132A (NM_000314 c.395G>C)         PRIOR THERAPY  None     CURRENT THERAPY  osimertinib 9/10/22 - present        CURRENT ONCOLOGICAL PROBLEMS  None     HPI   Mr. Pierre is a 73 yo with no significant PMH who had a low-dose screening chest CT on 7/11/22 concerning for 2.4 cm opacity in the RML with a few associated 4mm nodules. He met Dr. Mims on 8/10/22 and had EBUS on 8/15/22 with RML FNA revealing NSCLC  adenocarcinoma, PD-L1 2%, EGFR exon 19, neg lymph node 7. He had PET/CT scan on 8/22/22 with mild FDG-avidity of the opacity in the RML, no lymph nodes, and suspicious FDG-avidity of the proximal R femur and mild avidity of several ribs and L clavicle.  Brain MRI negative. He started osimertinib on 9/10/22. Initially with mild rash controlled with cetirizine. 12/2023 with different purple macular rash thought to also be cutaneous reaction to osimertinib; then with concern for asymptomatic G1 pneumonitis on restaging CT scan. Osimertinib held and started on 1 mg/kg prednisone. Remained asymptomatic and tapered down.      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 He has a son who lives in [place].  Tob: quit smoking 8/11/22, 1 pack/3-4 days, 44 years  EtOH: 1-2 beers every day  Illicits: none     CURRENT MEDS  see below     ALLERGIES  see below    "  FAMILY HISTORY  no one in the family with cancer    Subjective:  He is seen today alone. Seen with use of video Roxanne . He took his last dose of steroids yesterday. He continues to feel well without any symptoms of pneumonitis. No complaints.    Meds (Current):    Current Outpatient Medications:     amLODIPine (Norvasc) 10 mg tablet, Take 1 tablet (10 mg) by mouth once daily., Disp: , Rfl:     multivitamin-min-iron-FA-vit K (Multi-Day Plus Minerals) 18 mg iron-400 mcg-25 mcg tablet, Take 1 tablet by mouth once daily., Disp: , Rfl:     osimertinib (Tagrisso) 80 mg tablet, Take 1 tablet (80 mg total) by mouth once daily.  Swallow whole., Disp: 30 tablet, Rfl: 3    pantoprazole (ProtoNix) 40 mg EC tablet, Take 1 tablet (40 mg) by mouth once daily. Do not crush, chew, or split., Disp: 30 tablet, Rfl: 5    predniSONE (Deltasone) 10 mg tablet, Take 5 tablets (50 mg) by mouth once daily for 7 days, THEN 4 tablets (40 mg) once daily for 7 days, THEN 3 tablets (30 mg) once daily for 7 days, THEN 2 tablets (20 mg) once daily for 7 days., Disp: 98 tablet, Rfl: 0    triamcinolone (Kenalog) 0.1 % cream, Apply twice daily to affected areas of body (avoid face, groin, body folds) for 2 months, then taper to twice daily on weekends only; repeat every 6-8 weeks as needed for flares, Disp: 454 g, Rfl: 2    Review of Systems   All other systems reviewed and are negative.       Objective   BSA: 1.53 meters squared  /70 (BP Location: Left arm, Patient Position: Sitting)   Pulse 105   Temp 37.3 °C (99.1 °F) (Temporal)   Resp 18   Ht 1.593 m (5' 2.72\")   Wt 52.6 kg (116 lb)   SpO2 98%   BMI 20.73 kg/m²     ECOG Performance Status: 0  Asymptomatic     Physical Exam:  General: elderly male, well appearing, alert, conversant, in no apparent distress  HEENT: normocephalic, atraumatic, EOMI, no scleral icterus  CV: RRR, no murmurs  Pulm: CTAB, no wheezes or crackles, no increased work of breathing  Abd: soft, non " "tender, non distended  Ext: warm, no lower extremity edema  Skin: several patches of scale present on the left forearm  Neuro: moving all extremities  Psych: normal affect      Results:  Labs:  Lab Results   Component Value Date    WBC 8.3 02/20/2024    HGB 12.5 (L) 02/20/2024    HCT 35.1 (L) 02/20/2024    MCV 83 02/20/2024     02/20/2024      Lab Results   Component Value Date    NEUTROABS 6.09 (H) 02/20/2024      Lab Results   Component Value Date    GLUCOSE 107 (H) 12/21/2023    CALCIUM 8.5 (L) 12/21/2023     (L) 12/21/2023    K 4.1 12/21/2023    CO2 23 12/21/2023     12/21/2023    BUN 12 12/21/2023    CREATININE 0.81 12/21/2023    MG 2.11 12/21/2023     Lab Results   Component Value Date    ALT 10 12/21/2023    AST 17 12/21/2023    ALKPHOS 69 12/21/2023    BILITOT 0.5 12/21/2023    BILIDIR 0.1 01/06/2023      No results found for: \"ACTH\", \"CORTISOL\", \"TSH\", \"FREET4\"    Imaging:  I have personally reviewed the below imaging and concur with the reported findings unless otherwise stated:        Assessment/Plan      Sotero Pierre is a 73 y.o. male here for follow up of metastatic NSCLC on osimertinib here for review of his scans and labs.    # NSCLC adenocarcinoma  - [id number]  - has osseous metastases, asymptomatic  - PD-L1 2%, EGFR exon 19 deletion  - started osimertinib 9/10/22, tolerating well  - did have diffuse rash which resolved on prednisone. will not dose reduce unless rash recurs. unclear if this is drug rash - no pictures - has not recurred since  - had mild BLE edema which resolved   - 12/2023 had new pink/purple macular rash, evaluated by dermatology, likely due to osimertinib and is improving  - personally reviewed CT Scan with concern for pneumonitis - asymptomatic, held osimertinib and treated with steroids.  - remains asymptomatic and will repeat scan. If improving, consider rechallenge of osimertinib  - will need yearly brain MRI, due 9/2024 (not yet ordered)      # G1 pneumonitis  - " asymptomatic, seen on CT scan  - held osimertinib as above and started empiric steroids with 1 mg/kg prednisone, completed taper  - will obtain CT chest to evaluate for improvement. If improving, will consider rechallenge with osimertinib with close monitoring    #Liver hemangioma   -seen on MRI from 11/2022, corresponds with lesion visualized on CT scan     # BLE edema - resolved  - nonpitting, mild, resolves with elevation  - noted on 10/2022 mild LLE venous insufficiency  - echocardiogram normal     # Rash - see above  - had diffuse rash within weeks of starting osimertinib, which resolved on 5 days prednisone and has not recurred. unclear if this is a drug rash or not. discussed that if this recurs, to please be evaluated by our team/ACC  - had different rash in 12/2023, non pruritic, macular and purple in color. Saw dermatology, likely drug rash but recommended continuation of osimertinib     # Dilated common bile duct, intrahepatic biliary ducts  - seen on restaging scan, asymptomatic - RUQ US as well  - had MRCP and saw GI - will f/u with them     # Alcohol use  - recommended decreasing/stopping daily alcohol use while on osimertinib     # Advanced care planning  - discussed incurable but treatable nature of metastatic lung cancer. Goal of therapy is to prolong life and maintain quality of life. He is in agreement

## 2024-03-05 ENCOUNTER — HOSPITAL ENCOUNTER (OUTPATIENT)
Dept: RADIOLOGY | Facility: HOSPITAL | Age: 74
Discharge: HOME | End: 2024-03-05
Payer: COMMERCIAL

## 2024-03-05 DIAGNOSIS — C34.2 PRIMARY ADENOCARCINOMA OF MIDDLE LOBE OF RIGHT LUNG (MULTI): ICD-10-CM

## 2024-03-05 DIAGNOSIS — J98.4 PNEUMONITIS: ICD-10-CM

## 2024-03-05 PROCEDURE — 71260 CT THORAX DX C+: CPT

## 2024-03-05 PROCEDURE — 2550000001 HC RX 255 CONTRASTS: Performed by: STUDENT IN AN ORGANIZED HEALTH CARE EDUCATION/TRAINING PROGRAM

## 2024-03-05 PROCEDURE — 71260 CT THORAX DX C+: CPT | Performed by: RADIOLOGY

## 2024-03-05 RX ADMIN — IOHEXOL 65 ML: 350 INJECTION, SOLUTION INTRAVENOUS at 13:38

## 2024-03-07 ENCOUNTER — SPECIALTY PHARMACY (OUTPATIENT)
Dept: PHARMACY | Facility: CLINIC | Age: 74
End: 2024-03-07

## 2024-03-12 ENCOUNTER — SPECIALTY PHARMACY (OUTPATIENT)
Dept: PHARMACY | Facility: CLINIC | Age: 74
End: 2024-03-12

## 2024-03-12 ENCOUNTER — OFFICE VISIT (OUTPATIENT)
Dept: HEMATOLOGY/ONCOLOGY | Facility: HOSPITAL | Age: 74
End: 2024-03-12
Payer: COMMERCIAL

## 2024-03-12 ENCOUNTER — APPOINTMENT (OUTPATIENT)
Dept: HEMATOLOGY/ONCOLOGY | Facility: HOSPITAL | Age: 74
End: 2024-03-12
Payer: COMMERCIAL

## 2024-03-12 VITALS
BODY MASS INDEX: 19.91 KG/M2 | WEIGHT: 111.4 LBS | RESPIRATION RATE: 16 BRPM | OXYGEN SATURATION: 96 % | DIASTOLIC BLOOD PRESSURE: 81 MMHG | TEMPERATURE: 97.3 F | HEART RATE: 89 BPM | SYSTOLIC BLOOD PRESSURE: 135 MMHG

## 2024-03-12 DIAGNOSIS — J98.4 PNEUMONITIS: ICD-10-CM

## 2024-03-12 DIAGNOSIS — C34.2 PRIMARY ADENOCARCINOMA OF MIDDLE LOBE OF RIGHT LUNG (MULTI): ICD-10-CM

## 2024-03-12 PROCEDURE — 99215 OFFICE O/P EST HI 40 MIN: CPT | Performed by: STUDENT IN AN ORGANIZED HEALTH CARE EDUCATION/TRAINING PROGRAM

## 2024-03-12 PROCEDURE — 1126F AMNT PAIN NOTED NONE PRSNT: CPT | Performed by: STUDENT IN AN ORGANIZED HEALTH CARE EDUCATION/TRAINING PROGRAM

## 2024-03-12 PROCEDURE — RXMED WILLOW AMBULATORY MEDICATION CHARGE

## 2024-03-12 PROCEDURE — 1159F MED LIST DOCD IN RCRD: CPT | Performed by: STUDENT IN AN ORGANIZED HEALTH CARE EDUCATION/TRAINING PROGRAM

## 2024-03-12 PROCEDURE — 1036F TOBACCO NON-USER: CPT | Performed by: STUDENT IN AN ORGANIZED HEALTH CARE EDUCATION/TRAINING PROGRAM

## 2024-03-12 ASSESSMENT — PAIN SCALES - GENERAL: PAINLEVEL: 0-NO PAIN

## 2024-03-12 NOTE — PROGRESS NOTES
[facility] - Medical Oncology Follow-Up Visit    Patient ID: Sotero Pierre is a 73 y.o. male presenting with metastatic EGFR mutant NSCLC     Current therapy: Osimertinib     Chief Concern: follow up to discuss scans and labs    Oncologic History:   DIAGNOSIS  NSCLC adenocarcinoma     STAGING  [id number], stage IV     CURRENT SITES OF DISEASE  RML, R femur, ribs, L clavicle     MOLECULAR GENOMICS  PD-L1 2%  EGFR p.[id number] ([id number] c.2236_2250del15)   KRAS p.A146P (NM_033360 c.436G>C), subclonal   PTEN p.I135T (NM_000314 c.404T>C)   PTEN p.G132A (NM_000314 c.395G>C)         PRIOR THERAPY  None     CURRENT THERAPY  osimertinib 9/10/22 - present        CURRENT ONCOLOGICAL PROBLEMS  None     HPI   Mr. Pierre is a 73 yo with no significant PMH who had a low-dose screening chest CT on 7/11/22 concerning for 2.4 cm opacity in the RML with a few associated 4mm nodules. He met Dr. Mims on 8/10/22 and had EBUS on 8/15/22 with RML FNA revealing NSCLC  adenocarcinoma, PD-L1 2%, EGFR exon 19, neg lymph node 7. He had PET/CT scan on 8/22/22 with mild FDG-avidity of the opacity in the RML, no lymph nodes, and suspicious FDG-avidity of the proximal R femur and mild avidity of several ribs and L clavicle.  Brain MRI negative. He started osimertinib on 9/10/22. Initially with mild rash controlled with cetirizine. 12/2023 with different purple macular rash thought to also be cutaneous reaction to osimertinib; then with concern for asymptomatic G1 pneumonitis on restaging CT scan. Osimertinib held 1/23/24 and started on 1 mg/kg prednisone. Remained asymptomatic and tapered down. Repeat Chest CT with resolution of pneumonitis. Decision to rechallenge with osimertinib starting 3/13/24.      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 He has a son who lives in [place].  Tob: quit smoking 8/11/22, 1  pack/3-4 days, 44 years  EtOH: 1-2 beers every day  Illicits: none     CURRENT MEDS  see below     ALLERGIES  see below     FAMILY HISTORY  no one in the family with cancer    Subjective:  He is seen today alone. Seen with use of video Roxanne . He took his last dose of steroids 3 weeks ago and continues to feel well. He has no shortness of breath, no cough. No complaints.    Meds (Current):    Current Outpatient Medications:     amLODIPine (Norvasc) 10 mg tablet, Take 1 tablet (10 mg) by mouth once daily., Disp: , Rfl:     multivitamin-min-iron-FA-vit K (Multi-Day Plus Minerals) 18 mg iron-400 mcg-25 mcg tablet, Take 1 tablet by mouth once daily., Disp: , Rfl:     triamcinolone (Kenalog) 0.1 % cream, Apply twice daily to affected areas of body (avoid face, groin, body folds) for 2 months, then taper to twice daily on weekends only; repeat every 6-8 weeks as needed for flares, Disp: 454 g, Rfl: 2    osimertinib (Tagrisso) 80 mg tablet, Take 1 tablet (80 mg total) by mouth once daily.  Swallow whole. (Patient not taking: Reported on 2/20/2024), Disp: 30 tablet, Rfl: 3    pantoprazole (ProtoNix) 40 mg EC tablet, Take 1 tablet (40 mg) by mouth once daily. Do not crush, chew, or split. (Patient not taking: Reported on 3/12/2024), Disp: 30 tablet, Rfl: 5    Review of Systems   All other systems reviewed and are negative.       Objective   BSA: 1.49 meters squared  /81 (BP Location: Left arm, Patient Position: Sitting)   Pulse 89   Temp 36.3 °C (97.3 °F) (Temporal)   Resp 16   Wt 50.5 kg (111 lb 6.4 oz)   SpO2 96%   BMI 19.91 kg/m²     ECOG Performance Status: 0  Asymptomatic     Physical Exam:  General: elderly male, well appearing, alert, conversant, in no apparent distress  HEENT: normocephalic, atraumatic, EOMI, no scleral icterus  CV: RRR, no murmurs  Pulm: CTAB, no wheezes or crackles, no increased work of breathing  Abd: soft, non tender, non distended  Ext: warm, no lower extremity  "edema  Skin: several patches of scale present on the left forearm  Neuro: moving all extremities  Psych: normal affect      Results:  Labs:  Lab Results   Component Value Date    WBC 8.3 02/20/2024    HGB 12.5 (L) 02/20/2024    HCT 35.1 (L) 02/20/2024    MCV 83 02/20/2024     02/20/2024      Lab Results   Component Value Date    NEUTROABS 6.09 (H) 02/20/2024      Lab Results   Component Value Date    GLUCOSE 99 02/20/2024    CALCIUM 7.9 (L) 02/20/2024     (L) 02/20/2024    K 3.9 02/20/2024    CO2 24 02/20/2024     02/20/2024    BUN 11 02/20/2024    CREATININE 0.73 02/20/2024    MG 2.11 12/21/2023     Lab Results   Component Value Date    ALT 16 02/20/2024    AST 18 02/20/2024    ALKPHOS 64 02/20/2024    BILITOT 0.5 02/20/2024    BILIDIR 0.1 01/06/2023      No results found for: \"ACTH\", \"CORTISOL\", \"TSH\", \"FREET4\"    Imaging:  I have personally reviewed the below imaging and concur with the reported findings unless otherwise stated:    CT chest w IV contrast    Result Date: 3/5/2024  Impression: 1.  Interval near complete resolution of the previously seen extensive multifocal airspace opacities. Multiple residual 2-3 mm nodules and nodular densities  in bilateral lungs which appear improved compared to immediate prior study but new/increased when compared to more remote study dated 10/06/2023. Recommend continued attention on follow-up study to confirm continued improvement. 2. 9 x 8 mm right middle lobe nodule is again identified with adjacent posttreatment changes correlating with patient's known malignancy. 3. Other stable findings as described above.   MACRO: None   Signed by: Katherin Chavira 3/5/2024 2:03 PM Dictation workstation:   [id number]       Assessment/Plan      Sotero Pierre is a 73 y.o. male here for follow up of metastatic NSCLC on osimertinib here for review of his scans and labs.    # NSCLC adenocarcinoma  - [id number]  - has osseous metastases, asymptomatic  - PD-L1 2%, EGFR " exon 19 deletion  - started osimertinib 9/10/22, tolerating well  - did have diffuse rash which resolved on prednisone. will not dose reduce unless rash recurs. unclear if this is drug rash - no pictures - has not recurred since  - had mild BLE edema which resolved   - 12/2023 had new pink/purple macular rash, evaluated by dermatology, likely due to osimertinib and is improving  - personally reviewed CT Scan with concern for pneumonitis - asymptomatic, held osimertinib 1/23/24  and treated with steroids.  - remained asymptomatic after steroid taper and repeat scan with resolution of pneumonitis  - discussed rechallenge of osimertinib, as he was asymptomatic, but will monitor closely  - he will resume osimertinib tomorrow 3/13/24   - RTC 1 month with repeat scans and labs  - will need yearly brain MRI, due 9/2024 (not yet ordered)      # G1 pneumonitis - resolved  - asymptomatic, seen on CT scan  - held osimertinib as above and started empiric steroids with 1 mg/kg prednisone, completed taper  -remained asymptomatic off steroids, and scan with reslution of pneumonitis  - will rechallenge with osimertinib and monitor closely with repeat scan in 1 month    #Liver hemangioma   -seen on MRI from 11/2022, corresponds with lesion visualized on CT scan     # BLE edema - resolved  - nonpitting, mild, resolves with elevation  - noted on 10/2022 mild LLE venous insufficiency  - echocardiogram normal     # Rash - see above  - had diffuse rash within weeks of starting osimertinib, which resolved on 5 days prednisone and has not recurred. unclear if this is a drug rash or not. discussed that if this recurs, to please be evaluated by our team/ACC  - had different rash in 12/2023, non pruritic, macular and purple in color. Saw dermatology, likely drug rash but recommended continuation of osimertinib     # Dilated common bile duct, intrahepatic biliary ducts  - seen on restaging scan, asymptomatic - RUQ US as well  - had MRCP and  saw GI - will f/u with them     # Alcohol use  - recommended decreasing/stopping daily alcohol use while on osimertinib     # Advanced care planning  - discussed incurable but treatable nature of metastatic lung cancer. Goal of therapy is to prolong life and maintain quality of life. He is in agreement

## 2024-03-14 ENCOUNTER — PHARMACY VISIT (OUTPATIENT)
Dept: PHARMACY | Facility: CLINIC | Age: 74
End: 2024-03-14
Payer: COMMERCIAL

## 2024-03-22 ENCOUNTER — OFFICE VISIT (OUTPATIENT)
Dept: DERMATOLOGY | Facility: CLINIC | Age: 74
End: 2024-03-22
Payer: COMMERCIAL

## 2024-03-22 DIAGNOSIS — R21 RASH AND OTHER NONSPECIFIC SKIN ERUPTION: ICD-10-CM

## 2024-03-22 PROCEDURE — 1036F TOBACCO NON-USER: CPT | Performed by: DERMATOLOGY

## 2024-03-22 PROCEDURE — 99213 OFFICE O/P EST LOW 20 MIN: CPT | Performed by: DERMATOLOGY

## 2024-03-22 PROCEDURE — 1159F MED LIST DOCD IN RCRD: CPT | Performed by: DERMATOLOGY

## 2024-03-22 NOTE — PROGRESS NOTES
Subjective     Sotero Pierre is a 73 y.o. male who presents for the following: Rash (Triamcinolone cream - not helping much ).     Review of Systems:  No other skin or systemic complaints other than what is documented elsewhere in the note.    The following portions of the chart were reviewed this encounter and updated as appropriate:          Skin Cancer History  No skin cancer on file.      Specialty Problems    None       Objective   Well appearing patient in no apparent distress; mood and affect are within normal limits.    A focused skin examination was performed. All findings within normal limits unless otherwise noted below.    Assessment/Plan   1. Rash and other nonspecific skin eruption  Hyperpigmented patches on arms and legs    - rash, NOS, suspect cutaneous reaction to EGFR inhibitor which has now resolved with PIH   - since last visit 3 months ago, patient has been using triamcinolone 0.1% cream BID. In addition, he developed pneumonitis while on osimertinib so this was held and patient was started on a 3 week steroid taper. Rash resolved at this time, and osimertinib was restarted 3/13/24  - today, we reassured patient that rash is resolved, and residual brown spots are just hyperpigmentation  -STOP triamcinolone cream today, and counseled patient to make an appointment if he flares again  (which is possible given re-start of EGFR inhibitor)       Related Procedures  Follow Up In Dermatology - Established Patient    Related Medications  triamcinolone (Kenalog) 0.1 % cream  Apply twice daily to affected areas of body (avoid face, groin, body folds) for 2 months, then taper to twice daily on weekends only; repeat every 6-8 weeks as needed for flares    Bernard Oates MD  PGY-4 Dermatology    RTC prn    I saw and evaluated the patient. I personally obtained the key and critical portions of the history and physical exam or was physically present for key and critical portions performed by the  resident/fellow. I reviewed the resident/fellow's documentation and discussed the patient with the resident/fellow. I agree with the resident/fellow's medical decision making as documented in the note.    Blaine Li MD PhD

## 2024-04-02 DIAGNOSIS — C34.2 PRIMARY ADENOCARCINOMA OF MIDDLE LOBE OF RIGHT LUNG (MULTI): Primary | ICD-10-CM

## 2024-04-10 DIAGNOSIS — J98.4 OTHER DISORDERS OF LUNG: Primary | ICD-10-CM

## 2024-04-11 ENCOUNTER — HOSPITAL ENCOUNTER (OUTPATIENT)
Dept: RADIOLOGY | Facility: HOSPITAL | Age: 74
Discharge: HOME | End: 2024-04-11
Payer: MEDICARE

## 2024-04-11 ENCOUNTER — LAB (OUTPATIENT)
Dept: LAB | Facility: HOSPITAL | Age: 74
End: 2024-04-11
Payer: MEDICARE

## 2024-04-11 DIAGNOSIS — C34.2 PRIMARY ADENOCARCINOMA OF MIDDLE LOBE OF RIGHT LUNG (MULTI): ICD-10-CM

## 2024-04-11 LAB
ALBUMIN SERPL BCP-MCNC: 3.3 G/DL (ref 3.4–5)
ALP SERPL-CCNC: 65 U/L (ref 33–136)
ALT SERPL W P-5'-P-CCNC: 60 U/L (ref 10–52)
ANION GAP SERPL CALC-SCNC: 11 MMOL/L (ref 10–20)
AST SERPL W P-5'-P-CCNC: 84 U/L (ref 9–39)
BASOPHILS # BLD AUTO: 0.01 X10*3/UL (ref 0–0.1)
BASOPHILS NFR BLD AUTO: 0.2 %
BILIRUB SERPL-MCNC: 0.6 MG/DL (ref 0–1.2)
BUN SERPL-MCNC: 7 MG/DL (ref 6–23)
CALCIUM SERPL-MCNC: 7.8 MG/DL (ref 8.6–10.3)
CHLORIDE SERPL-SCNC: 100 MMOL/L (ref 98–107)
CO2 SERPL-SCNC: 27 MMOL/L (ref 21–32)
CREAT SERPL-MCNC: 0.8 MG/DL (ref 0.5–1.3)
EGFRCR SERPLBLD CKD-EPI 2021: >90 ML/MIN/1.73M*2
EOSINOPHIL # BLD AUTO: 0.02 X10*3/UL (ref 0–0.4)
EOSINOPHIL NFR BLD AUTO: 0.4 %
ERYTHROCYTE [DISTWIDTH] IN BLOOD BY AUTOMATED COUNT: 15.6 % (ref 11.5–14.5)
GLUCOSE SERPL-MCNC: 91 MG/DL (ref 74–99)
HCT VFR BLD AUTO: 34.4 % (ref 41–52)
HGB BLD-MCNC: 12 G/DL (ref 13.5–17.5)
IMM GRANULOCYTES # BLD AUTO: 0.06 X10*3/UL (ref 0–0.5)
IMM GRANULOCYTES NFR BLD AUTO: 1.2 % (ref 0–0.9)
LYMPHOCYTES # BLD AUTO: 0.82 X10*3/UL (ref 0.8–3)
LYMPHOCYTES NFR BLD AUTO: 16.8 %
MCH RBC QN AUTO: 30 PG (ref 26–34)
MCHC RBC AUTO-ENTMCNC: 34.9 G/DL (ref 32–36)
MCV RBC AUTO: 86 FL (ref 80–100)
MONOCYTES # BLD AUTO: 0.68 X10*3/UL (ref 0.05–0.8)
MONOCYTES NFR BLD AUTO: 14 %
NEUTROPHILS # BLD AUTO: 3.28 X10*3/UL (ref 1.6–5.5)
NEUTROPHILS NFR BLD AUTO: 67.4 %
NRBC BLD-RTO: 0 /100 WBCS (ref 0–0)
PLATELET # BLD AUTO: 186 X10*3/UL (ref 150–450)
POTASSIUM SERPL-SCNC: 4 MMOL/L (ref 3.5–5.3)
PROT SERPL-MCNC: 6.6 G/DL (ref 6.4–8.2)
RBC # BLD AUTO: 4 X10*6/UL (ref 4.5–5.9)
SODIUM SERPL-SCNC: 134 MMOL/L (ref 136–145)
WBC # BLD AUTO: 4.9 X10*3/UL (ref 4.4–11.3)

## 2024-04-11 PROCEDURE — 85025 COMPLETE CBC W/AUTO DIFF WBC: CPT

## 2024-04-11 PROCEDURE — 36415 COLL VENOUS BLD VENIPUNCTURE: CPT

## 2024-04-11 PROCEDURE — 80053 COMPREHEN METABOLIC PANEL: CPT

## 2024-04-11 PROCEDURE — 2550000001 HC RX 255 CONTRASTS: Mod: SE | Performed by: STUDENT IN AN ORGANIZED HEALTH CARE EDUCATION/TRAINING PROGRAM

## 2024-04-11 PROCEDURE — 71260 CT THORAX DX C+: CPT

## 2024-04-11 PROCEDURE — 71260 CT THORAX DX C+: CPT | Performed by: RADIOLOGY

## 2024-04-11 RX ADMIN — IOHEXOL 75 ML: 350 INJECTION, SOLUTION INTRAVENOUS at 12:49

## 2024-04-12 ENCOUNTER — SPECIALTY PHARMACY (OUTPATIENT)
Dept: PHARMACY | Facility: CLINIC | Age: 74
End: 2024-04-12

## 2024-04-16 ENCOUNTER — OFFICE VISIT (OUTPATIENT)
Dept: HEMATOLOGY/ONCOLOGY | Facility: HOSPITAL | Age: 74
End: 2024-04-16
Payer: COMMERCIAL

## 2024-04-16 VITALS
RESPIRATION RATE: 17 BRPM | HEART RATE: 89 BPM | TEMPERATURE: 97 F | DIASTOLIC BLOOD PRESSURE: 64 MMHG | SYSTOLIC BLOOD PRESSURE: 116 MMHG | WEIGHT: 110.8 LBS | OXYGEN SATURATION: 100 % | BODY MASS INDEX: 19.8 KG/M2

## 2024-04-16 DIAGNOSIS — C34.2 PRIMARY ADENOCARCINOMA OF MIDDLE LOBE OF RIGHT LUNG (MULTI): Primary | ICD-10-CM

## 2024-04-16 PROCEDURE — 99215 OFFICE O/P EST HI 40 MIN: CPT | Performed by: STUDENT IN AN ORGANIZED HEALTH CARE EDUCATION/TRAINING PROGRAM

## 2024-04-16 PROCEDURE — RXMED WILLOW AMBULATORY MEDICATION CHARGE

## 2024-04-16 PROCEDURE — 1159F MED LIST DOCD IN RCRD: CPT | Performed by: STUDENT IN AN ORGANIZED HEALTH CARE EDUCATION/TRAINING PROGRAM

## 2024-04-16 PROCEDURE — 1126F AMNT PAIN NOTED NONE PRSNT: CPT | Performed by: STUDENT IN AN ORGANIZED HEALTH CARE EDUCATION/TRAINING PROGRAM

## 2024-04-16 ASSESSMENT — PAIN SCALES - GENERAL: PAINLEVEL: 0-NO PAIN

## 2024-04-16 NOTE — PROGRESS NOTES
[facility] - Medical Oncology Follow-Up Visit    Patient ID: Sotero Pierre is a 73 y.o. male presenting with metastatic EGFR mutant NSCLC     Current therapy: Osimertinib     Chief Concern: follow up to discuss scans and labs    Oncologic History:   DIAGNOSIS  NSCLC adenocarcinoma     STAGING  [id number], stage IV     CURRENT SITES OF DISEASE  RML, R femur, ribs, L clavicle     MOLECULAR GENOMICS  PD-L1 2%  EGFR p.[id number] (NM_005228 c.2236_2250del15)   KRAS p.A146P (NM_033360 c.436G>C), subclonal   PTEN p.I135T (NM_000314 c.404T>C)   PTEN p.G132A (NM_000314 c.395G>C)         PRIOR THERAPY  None     CURRENT THERAPY  osimertinib 9/10/22 - present        CURRENT ONCOLOGICAL PROBLEMS  None     HPI   Mr. Pierre is a 71 yo with no significant PMH who had a low-dose screening chest CT on 7/11/22 concerning for 2.4 cm opacity in the RML with a few associated 4mm nodules. He met Dr. Mims on 8/10/22 and had EBUS on 8/15/22 with RML FNA revealing NSCLC  adenocarcinoma, PD-L1 2%, EGFR exon 19, neg lymph node 7. He had PET/CT scan on 8/22/22 with mild FDG-avidity of the opacity in the RML, no lymph nodes, and suspicious FDG-avidity of the proximal R femur and mild avidity of several ribs and L clavicle.  Brain MRI negative. He started osimertinib on 9/10/22. Initially with mild rash controlled with cetirizine. 12/2023 with different purple macular rash thought to also be cutaneous reaction to osimertinib; then with concern for asymptomatic G1 pneumonitis on restaging CT scan. Osimertinib held 1/23/24 and started on 1 mg/kg prednisone. Remained asymptomatic and tapered down. Repeat Chest CT with resolution of pneumonitis. Decision to rechallenge with osimertinib starting 3/13/24.      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 He has a son who lives in [place].  Tob: quit smoking 8/11/22, 1  pack/3-4 days, 44 years  EtOH: 1-2 beers every day  Illicits: none     CURRENT MEDS  see below     ALLERGIES  see below     FAMILY HISTORY  no one in the family with cancer    Subjective:  He is seen today alone. Seen with use of video Roxanne . He is feeling well overall, and no trouble breathing and no cough. He saw dermatology and they told him to stop the steroid cream. With restarting the osimertinib, he has not had recurrence of pruritus or rash. No issues with diarrhea.    Meds (Current):    Current Outpatient Medications:     amLODIPine (Norvasc) 10 mg tablet, Take 1 tablet (10 mg) by mouth once daily., Disp: , Rfl:     multivitamin-min-iron-FA-vit K (Multi-Day Plus Minerals) 18 mg iron-400 mcg-25 mcg tablet, Take 1 tablet by mouth once daily., Disp: , Rfl:     osimertinib (Tagrisso) 80 mg tablet, Take 1 tablet (80 mg total) by mouth once daily.  Swallow whole., Disp: 30 tablet, Rfl: 3    pantoprazole (ProtoNix) 40 mg EC tablet, Take 1 tablet (40 mg) by mouth once daily. Do not crush, chew, or split. (Patient not taking: Reported on 3/12/2024), Disp: 30 tablet, Rfl: 5    triamcinolone (Kenalog) 0.1 % cream, Apply twice daily to affected areas of body (avoid face, groin, body folds) for 2 months, then taper to twice daily on weekends only; repeat every 6-8 weeks as needed for flares, Disp: 454 g, Rfl: 2    Review of Systems   All other systems reviewed and are negative.       Objective   BSA: 1.49 meters squared  /64 (BP Location: Left arm, Patient Position: Sitting)   Pulse 89   Temp 36.1 °C (97 °F) (Temporal)   Resp 17   Wt 50.3 kg (110 lb 12.8 oz)   SpO2 100%   BMI 19.80 kg/m²     ECOG Performance Status: 0  Asymptomatic     Physical Exam:  General: elderly male, well appearing, alert, conversant, in no apparent distress  HEENT: normocephalic, atraumatic, EOMI, no scleral icterus  CV: RRR, no murmurs  Pulm: CTAB, no wheezes or crackles, no increased work of breathing  Abd: soft, non  "tender, non distended  Ext: warm, no lower extremity edema  Skin: several patches of scale present on the left forearm  Neuro: moving all extremities  Psych: normal affect      Results:  Labs:  Lab Results   Component Value Date    WBC 4.9 04/11/2024    HGB 12.0 (L) 04/11/2024    HCT 34.4 (L) 04/11/2024    MCV 86 04/11/2024     04/11/2024      Lab Results   Component Value Date    NEUTROABS 3.28 04/11/2024      Lab Results   Component Value Date    GLUCOSE 91 04/11/2024    CALCIUM 7.8 (L) 04/11/2024     (L) 04/11/2024    K 4.0 04/11/2024    CO2 27 04/11/2024     04/11/2024    BUN 7 04/11/2024    CREATININE 0.80 04/11/2024    MG 2.11 12/21/2023     Lab Results   Component Value Date    ALT 60 (H) 04/11/2024    AST 84 (H) 04/11/2024    ALKPHOS 65 04/11/2024    BILITOT 0.6 04/11/2024    BILIDIR 0.1 01/06/2023      No results found for: \"ACTH\", \"CORTISOL\", \"TSH\", \"FREET4\"    Imaging:  I have personally reviewed the below imaging and concur with the reported findings unless otherwise stated:    CT chest w IV contrast    Result Date: 4/11/2024  Impression: 1. Continued interval improvement in pulmonary ground-glass opacities with residual predominantly peripheral ground-glass opacities remaining. Recommend continued attention on close interval follow-up imaging given history of immunotherapy rechallenge. 2. Stable posttreatment changes in the right middle lobe in the area of known prior malignancy. 3. Scattered sub-6 mm pulmonary nodules which are stable. No new or enlarging nodules. 4. Ill-defined focal area of enhancement in the posterior right hepatic lobe. While this may represent a focal area of vascular shunting or flash filling hemangioma, recommend definitive evaluation with liver MRI. 5. Stable mild dilation of the ascending aorta measuring up to 4 cm.   MACRO: Critical Finding:  See findings. Notification was initiated on 4/11/2024 at 1:30 pm by  Corey Dietz.  (**-YCF-**) Instructions:   " Signed by: Corey Dietz 4/11/2024 1:31 PM Dictation workstation:   [id number]         Assessment/Plan      Sotero Pierre is a 73 y.o. male here for follow up of metastatic NSCLC on osimertinib here for review of his scans and labs.    # NSCLC adenocarcinoma  - [id number]  - has osseous metastases, asymptomatic  - PD-L1 2%, EGFR exon 19 deletion  - started osimertinib 9/10/22, tolerating well  - did have diffuse rash which resolved on prednisone. will not dose reduce unless rash recurs. unclear if this is drug rash - no pictures - has not recurred since  - had mild BLE edema which resolved   - 12/2023 had new pink/purple macular rash, evaluated by dermatology, likely due to osimertinib and is improving  - personally reviewed CT Scan with concern for pneumonitis - asymptomatic, held osimertinib 1/23/24  and treated with steroids.  - remained asymptomatic after steroid taper and repeat scan with resolution of pneumonitis  - discussed rechallenge of osimertinib, as he was asymptomatic, but will monitor closely  - he resumed osimertinib 3/13/24 and has tolerated well  - repeat CT scan without recurrence of pneumonitis  - will continue on osimertinib, next CT scans in 3 months with labs  - will need yearly brain MRI, due 9/2024 (not yet ordered)      # G1 pneumonitis - resolved  - asymptomatic, seen on CT scan  - held osimertinib as above and started empiric steroids with 1 mg/kg prednisone, completed taper  -remained asymptomatic off steroids, and scan with reslution of pneumonitis  - will rechallenge with osimertinib and monitor closely with repeat scan in 1 month    #Liver hemangioma   -seen on MRI from 11/2022, corresponds with lesion visualized on CT scan     # BLE edema - resolved  - nonpitting, mild, resolves with elevation  - noted on 10/2022 mild LLE venous insufficiency  - echocardiogram normal     # Rash - see above  - had diffuse rash within weeks of starting osimertinib, which resolved on 5 days prednisone and has  not recurred. unclear if this is a drug rash or not. discussed that if this recurs, to please be evaluated by our team/ACC  - had different rash in 12/2023, non pruritic, macular and purple in color. Saw dermatology, likely drug rash but recommended continuation of osimertinib     # Dilated common bile duct, intrahepatic biliary ducts  - seen on restaging scan, asymptomatic - RUQ US as well  - had MRCP and saw GI - will f/u with them     # Alcohol use  - recommended decreasing/stopping daily alcohol use while on osimertinib     # Advanced care planning  - discussed incurable but treatable nature of metastatic lung cancer. Goal of therapy is to prolong life and maintain quality of life. He is in agreement

## 2024-04-17 ENCOUNTER — PHARMACY VISIT (OUTPATIENT)
Dept: PHARMACY | Facility: CLINIC | Age: 74
End: 2024-04-17
Payer: COMMERCIAL

## 2024-04-24 ENCOUNTER — SPECIALTY PHARMACY (OUTPATIENT)
Dept: PHARMACY | Facility: CLINIC | Age: 74
End: 2024-04-24

## 2024-05-09 PROCEDURE — RXMED WILLOW AMBULATORY MEDICATION CHARGE

## 2024-05-10 ENCOUNTER — SPECIALTY PHARMACY (OUTPATIENT)
Dept: PHARMACY | Facility: CLINIC | Age: 74
End: 2024-05-10

## 2024-05-15 ENCOUNTER — PHARMACY VISIT (OUTPATIENT)
Dept: PHARMACY | Facility: CLINIC | Age: 74
End: 2024-05-15
Payer: COMMERCIAL

## 2024-06-05 ENCOUNTER — SPECIALTY PHARMACY (OUTPATIENT)
Dept: PHARMACY | Facility: CLINIC | Age: 74
End: 2024-06-05

## 2024-06-07 ENCOUNTER — SPECIALTY PHARMACY (OUTPATIENT)
Dept: PHARMACY | Facility: CLINIC | Age: 74
End: 2024-06-07

## 2024-06-07 PROCEDURE — RXMED WILLOW AMBULATORY MEDICATION CHARGE

## 2024-06-14 ENCOUNTER — PHARMACY VISIT (OUTPATIENT)
Dept: PHARMACY | Facility: CLINIC | Age: 74
End: 2024-06-14
Payer: COMMERCIAL

## 2024-07-08 ENCOUNTER — SPECIALTY PHARMACY (OUTPATIENT)
Dept: PHARMACY | Facility: CLINIC | Age: 74
End: 2024-07-08

## 2024-07-08 PROCEDURE — RXMED WILLOW AMBULATORY MEDICATION CHARGE

## 2024-07-12 ENCOUNTER — PHARMACY VISIT (OUTPATIENT)
Dept: PHARMACY | Facility: CLINIC | Age: 74
End: 2024-07-12
Payer: COMMERCIAL

## 2024-07-17 ENCOUNTER — APPOINTMENT (OUTPATIENT)
Dept: RADIOLOGY | Facility: HOSPITAL | Age: 74
End: 2024-07-17
Payer: COMMERCIAL

## 2024-07-17 DIAGNOSIS — C34.2 PRIMARY ADENOCARCINOMA OF MIDDLE LOBE OF RIGHT LUNG (MULTI): ICD-10-CM

## 2024-07-17 PROCEDURE — 2550000001 HC RX 255 CONTRASTS: Performed by: STUDENT IN AN ORGANIZED HEALTH CARE EDUCATION/TRAINING PROGRAM

## 2024-07-17 PROCEDURE — 74177 CT ABD & PELVIS W/CONTRAST: CPT

## 2024-07-30 ENCOUNTER — APPOINTMENT (OUTPATIENT)
Dept: HEMATOLOGY/ONCOLOGY | Facility: HOSPITAL | Age: 74
End: 2024-07-30
Payer: COMMERCIAL